# Patient Record
Sex: FEMALE | Race: BLACK OR AFRICAN AMERICAN | Employment: OTHER | URBAN - METROPOLITAN AREA
[De-identification: names, ages, dates, MRNs, and addresses within clinical notes are randomized per-mention and may not be internally consistent; named-entity substitution may affect disease eponyms.]

---

## 2017-01-05 ENCOUNTER — OFFICE VISIT (OUTPATIENT)
Dept: VASCULAR SURGERY | Age: 62
End: 2017-01-05

## 2017-01-05 DIAGNOSIS — I83.812 VARICOSE VEINS OF LEFT LOWER EXTREMITIES WITH PAIN: ICD-10-CM

## 2017-01-05 DIAGNOSIS — I87.2 VENOUS REFLUX: ICD-10-CM

## 2017-01-05 NOTE — PROCEDURES
[facility]   *** FINAL REPORT ***    Name: Gabriel Guerrero  MRN: [id number]       Outpatient  : 01 Dec 1955  HIS Order #: [id number]  [address] [facility] Visit #: [id number]  Date: 2017    TYPE OF TEST: Peripheral Venous Testing    REASON FOR TEST  Varicose veins, VV with pain, swelling or edema, Limb Pain    Left Leg:-  Deep venous thrombosis:           No  Superficial venous thrombosis:    Yes  Deep venous insufficiency:        Yes  Superficial venous insufficiency: Yes    Abnormal Valve Closure Times (seconds):    Left Common Femoral:  5.3    Left SFJ:             5.2    Left GSV proximal:    0.7    Left GSV mid:         1.8    Left SSV:             1.2    Vein Mapping:    Diam.   Depth  (mm)    Left Great Saphenous Vein:    High Thigh:      9.0    Mid Thigh:       2.4    Low Thigh:       1.8    Knee:            5.7    High Calf:       6.2    Low Calf: Ankle:    Left Small Saphenous Vein:    SPJ:             2.6    Mid Calf:        2.2    Ankle:           1.6    Giacomini:  Accessory saph.:  Reyes :  Markus :      INTERPRETATION/FINDINGS  Duplex images were obtained using 2-D gray scale, color flow and  spectral doppler analysis. Left leg :  1. No evidence of deep venous thrombosis detected in the common  femoral, femoral, popliteal, posterior tibial and peroneal veins. 2. Superficial venous thrombosis identified in the tributaries of the  anterior thigh branch. 3. Incompetent left great saphenous vein at the junction with reflux  in the anterior thigh branch of 4.6 seconds and in the GSV in the  proximal, distal thigh and below knee levels. Known history of GSV  ablation 9-15-06,  Classification: T4.  4. Deep venous reflux involving the common femoral vein. 5. Incompetent small saphenous vein with reflux in the mid calf. No  reflux at the junction. 6. Multiphasic tibial doppler signal at rest.  7. Patent contralateral CFV without thrombus.  Deep venous reflux in  the CFV measuring 0.8 seconds and superficial venous reflux in the GSV   at the [facility] measuring 4.5 seconds. Compared to 2007 exam, GSV is now widely patent s/p ablation and  continued evidence of significant reflux in the anterior thigh branch. SSV reflux is new from prior exam.    ADDITIONAL COMMENTS    I have personally reviewed the data relevant to the interpretation of  this  study. TECHNOLOGIST: Katty Vega RDMS  Signed: 01/05/2017 11:42 AM    PHYSICIAN: Isaías Gifford D.O.   Signed: 01/05/2017 05:20 PM

## 2017-01-17 ENCOUNTER — OFFICE VISIT (OUTPATIENT)
Dept: VASCULAR SURGERY | Age: 62
End: 2017-01-17

## 2017-01-17 VITALS
WEIGHT: 246 LBS | DIASTOLIC BLOOD PRESSURE: 74 MMHG | SYSTOLIC BLOOD PRESSURE: 132 MMHG | BODY MASS INDEX: 38.61 KG/M2 | RESPIRATION RATE: 18 BRPM | HEART RATE: 86 BPM | HEIGHT: 67 IN

## 2017-01-17 DIAGNOSIS — I83.893 VARICOSE VEINS OF BILATERAL LOWER EXTREMITIES WITH OTHER COMPLICATIONS: ICD-10-CM

## 2017-01-17 DIAGNOSIS — I87.2 SAPHENOFEMORAL VENOUS REFLUX: Primary | ICD-10-CM

## 2017-01-17 NOTE — MR AVS SNAPSHOT
Visit Information Date & Time Provider Department Dept. Phone Encounter #  
 1/17/2017  9:45 AM SHARI Mckinley BS [facility] [phone] [id number] Upcoming Health Maintenance Date Due DTaP/Tdap/Td series (1 - Tdap) 12/1/1976 PAP AKA CERVICAL CYTOLOGY 12/1/1976 BREAST CANCER SCRN MAMMOGRAM 12/1/2005 FOBT Q 1 YEAR AGE 50-75 12/1/2005 ZOSTER VACCINE AGE 60> 12/1/2015 INFLUENZA AGE 9 TO ADULT 8/1/2016 Allergies as of 1/17/2017  Review Complete On: 1/17/2017 By: Leslee Walker RN Severity Noted Reaction Type Reactions Latex, Natural Rubber  02/28/2014    Rash Doxycycline  02/28/2014    Shortness of Breath, Swelling Erythromycin  08/05/2015    Unknown (comments) Current Immunizations  Never Reviewed No immunizations on file. Not reviewed this visit You Were Diagnosed With   
  
 Codes Comments Saphenofemoral venous reflux    -  Primary ICD-10-CM: D02.0 ICD-9-CM: 459.81 Varicose veins of bilateral lower extremities with other complications     KXY-23-OM: L13.013 ICD-9-CM: 454.8 Vitals BP Pulse Resp Height(growth percentile) Weight(growth percentile) BMI  
 132/74 (BP 1 Location: Left arm, BP Patient Position: Sitting) 86 18 5' 7\" (1.702 m) 246 lb (111.6 kg) 38.53 kg/m2 OB Status Smoking Status Hysterectomy Former Smoker BMI and BSA Data Body Mass Index Body Surface Area 38.53 kg/m 2 2.3 m 2 Preferred Pharmacy Pharmacy Name Phone [facility] [address] [facility], [address] [facility] [phone] Your Updated Medication List  
  
   
This list is accurate as of: 1/17/17 10:08 AM.  Always use your most recent med list.  
  
  
  
  
 ALEVE 220 mg Cap Generic drug:  naproxen sodium Take 220 mg by mouth. Indications: PAIN  
  
 amLODIPine 5 mg tablet Commonly known as:  NORVASC  
  
 aspirin, buffered 81 mg Tab Take 81 mg by mouth daily. Indications: THROMBOSIS PREVENTION AFTER PCI  
  
 atorvastatin 40 mg tablet Commonly known as:  LIPITOR Take 40 mg by mouth daily. BENICAR HCT 40-12.5 mg per tablet Generic drug:  olmesartan-hydroCHLOROthiazide  
  
 black cohosh 540 mg Cap Take  by mouth daily. Indications: unsure of mg's CO Q-10 200 mg capsule Generic drug:  coenzyme Q-10 Take 200 mg by mouth daily. Indications: takes 5 times weekly  
  
 ibuprofen 800 mg tablet Commonly known as:  MOTRIN Take 1 Tab by mouth three (3) times daily as needed for Pain. LINZESS 145 mcg Cap capsule Generic drug:  linaclotide STOPPED BEFORE SURGERY  
  
 MIRALAX 17 gram packet Generic drug:  polyethylene glycol Take 17 g by mouth daily. oxyCODONE-acetaminophen 5-325 mg per tablet Commonly known as:  PERCOCET  
1-2 tablets every 4-6 hours prn pain  
  
 simvastatin 20 mg tablet Commonly known as:  ZOCOR SYNTHROID 75 mcg tablet Generic drug:  levothyroxine  
  
 traMADol 50 mg tablet Commonly known as:  ULTRAM  
  
 VOLTAREN 1 % Gel Generic drug:  diclofenac Please provide this summary of care documentation to your next provider. Your primary care clinician is listed as Lencho Ceja. If you have any questions after today's visit, please call [phone].

## 2017-01-17 NOTE — PROGRESS NOTES
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  After years of no recurrent symptoms she was advised that she could discontinue coumadin and take just aspirin      She had described that over the past few months she had noticed increased appearance of veins at her left ankle and with that increased pain and swelling as well  She has always worn stockings as well as elevates and exercises everyday  But these veins developed in spite of those efforts, so she inquired if any possible changes could have occurred    We ordered a reflux study and she is here to discuss results today, which were reviewed as follows: Abnormal Valve Closure Times (seconds):  Left Common Femoral: 5.3  Left SFJ: 5.2  Left GSV proximal: 0.7  Left GSV mid: 1.8  Left SSV: 1.2     Vein Mapping: Diam. Depth (mm)     Left Great Saphenous Vein:  High Thigh: 9.0  Mid Thigh: 2.4  Low Thigh: 1.8  Knee: 5.7  High Calf: 6.2     INTERPRETATION/FINDINGS  Left leg :  1. No evidence of deep venous thrombosis detected in the common femoral, femoral, popliteal, posterior tibial and peroneal veins. 2. Superficial venous thrombosis identified in the tributaries of the anterior thigh branch. 3. Incompetent left great saphenous vein at the junction with reflux in the anterior thigh branch of 4.6 seconds and in the GSV in the proximal, distal thigh and below knee levels. Known history of GSV ablation 9-15-06, Classification: T4.  4. Deep venous reflux involving the common femoral vein. 5. Incompetent small saphenous vein with reflux in the mid calf. No reflux at the junction. 6. Multiphasic tibial doppler signal at rest.  7. Patent contralateral CFV without thrombus. Deep venous reflux in the CFV measuring 0.8 seconds and superficial venous reflux in the GSV at the [facility] measuring 4.5 seconds.   Compared to 2007 exam, GSV is now widely patent s/p ablation and continued evidence of significant reflux in the anterior thigh branch. SSV reflux is new from prior exam.    I did explain that the prior ablation did \"re-open\"  Vein is not very dilated but could at least do repeat ablation on proximal portion  Then will follow up for that accessory vein area and some of her lower leg veins for potential sclerotherapy  We did review details for the ablation procedure since it has been years since her last one. She acknowledges an understanding. She is interested in proceeding after her  recovers from his knee replacement surgery next month.  We will contact her for scheduling arrangements    Total time spent was 15 minutes

## 2017-03-21 ENCOUNTER — DOCUMENTATION ONLY (OUTPATIENT)
Dept: VASCULAR SURGERY | Age: 62
End: 2017-03-21

## 2017-03-21 NOTE — PROGRESS NOTES
Patient called in. She went to go get her stockings and found out that they need to be custom made. She states she will not have them before her procedure on Tuesday March 28th. She is going to cancel until she gets her stockings and then reschedule. She called back in after this conversation asking if it was ok to get the pantyhose instead of the thigh high and I informed her this was fine as long as the compression was the same.

## 2017-03-24 DIAGNOSIS — I83.893 VARICOSE VEINS OF BOTH LEGS WITH EDEMA: Primary | ICD-10-CM

## 2017-03-24 RX ORDER — LORAZEPAM 1 MG/1
TABLET ORAL
Qty: 3 TAB | Refills: 0
Start: 2017-03-24 | End: 2017-05-01 | Stop reason: SDUPTHER

## 2017-03-24 NOTE — TELEPHONE ENCOUNTER
Order for Ativan 1 mg to take as directed and bring to procedure, #3/0 placed per Verbal order from Dr. Radha Dailey . Pt verbalized understanding .

## 2017-05-01 DIAGNOSIS — I83.893 VARICOSE VEINS OF BOTH LEGS WITH EDEMA: Primary | ICD-10-CM

## 2017-05-01 RX ORDER — LORAZEPAM 1 MG/1
TABLET ORAL
Qty: 3 TAB | Refills: 0
Start: 2017-05-01 | End: 2018-07-13

## 2017-05-01 NOTE — TELEPHONE ENCOUNTER
Order for ativan 1 mg to be taken as directed and brought to procedure #3/0 placed per Verbal order from Dr. Cameron Grant . Pt verbalized understanding .

## 2017-05-05 ENCOUNTER — OFFICE VISIT (OUTPATIENT)
Dept: VASCULAR SURGERY | Age: 62
End: 2017-05-05

## 2017-05-05 DIAGNOSIS — I87.2 VENOUS (PERIPHERAL) INSUFFICIENCY: ICD-10-CM

## 2017-05-05 DIAGNOSIS — M79.89 SWELLING OF LIMB: ICD-10-CM

## 2017-05-05 DIAGNOSIS — I83.892 VARICOSE VEINS OF LOWER EXTREMITIES WITH COMPLICATIONS, LEFT: ICD-10-CM

## 2017-05-05 NOTE — PROGRESS NOTES
[facility]    [employer]    Pre-Operative Diagnosis: Symptomatic superficial venous incompetence  Post-Operative Diagnosis: Symptomatic superficial venous incompetence  Procedure: Endovenous ablation left greater saphenous vein, endovenous ablation left greater saphenous accessory vein  Surgeon: Kapil Tejeda MD   Anesthesia: Local  Estimated Blood Loss: Minimal  Complications: None    Pre pain assessment: 0 out of 10. Post pain assessment: 0 out of 10. The patient was placed on the procedure table. The left leg was prepped and draped in the usual sterile fashion. The skin was anesthetized with 1% Xylocaine. Ultrasound guided access was obtained at the at the knee level into the left left  saphenous vein after the vein had been mapped with duplex ultrasound. The micro puncture wire was advanced and sheath placed over the wire into the saphenous vein. The RFA Closure catheter was then advanced under direct visualization to the saphenofemoral junction. The probes of the catheter were opened and the catheter was withdrawn to approximately 2 cm distal to the  saphenofemoral junction. Continuous irrigation through the catheter was done to prevent clotting. After adequate positioning of the catheter had been verified, the leg was infiltrated with Tumescent anesthesia using Lidocaine with epinephrine. It was then proceeded with radio frequency ablation of the saphenous vein starting at the  saphenofemoral level. The patient was placed in Trendelenburg. Pressure was supplied to the vein and the temperature was maintained around 120 degrees. The impedance was within range. The vein was treated down to the level of the knee with excellent results by ultrasound as demonstrated by thickening of the vein wall and no flow. Treatment time was 4 minutes. The catheter was then pulled into the sheath. The sheath was pulled out of the vein and pressure was applied.   The patient was observed for 10 minutes and appeared to be in stable condition. A thigh high compression stocking was applied. Vital signs remained stable throughput the procedure. The patient was then discharged in stable condition and follow-up was arranged. A second greater saphenous vein was identified to be complete within the sheath anterior to the main greater saphenous. I gained access to this and placed a sheath and passed the catheter all the way up to the saphenofemoral junction access was just above the knee on the left thigh to the saphenofemoral junction. A tumesced similarly as the greater and then did pullback ablation same temperature settings.   She tolerated this well sutures were placed at both access sites    Mayra Wilson MD

## 2017-05-05 NOTE — PATIENT INSTRUCTIONS
POST CLOSURE INSTRUCTIONS:    REST FOR THE REMAINDER OF THE DAY AND ELEVATE YOUR LEGS , YOU MAY AMBULATE. FREQUENT AMBULATION, BUT REFRAIN FROM STRENUOUS ACTIVITY, HEAVY LIFTING, OR STANDING FOR LONG PERIODS OF TIME FOR ONE WEEK. RETURN WITH IN A WEEK FOR FOLLOW UP ULTRASOUND EXAMINATION OF THE TREATED VEIN TO RULE OUT THE PRESENCE OF A CLOT IN THE VEIN. THEN WEAR YOUR ELASTIC SUPPORT STOCKINGS NIGHT AND DAY FOR THE FIRST TWO WEEKS. FOR THE NEXT FOUR WEEKS, WEAR THEM DAILY , YOU MAY REMOVE THEM AT NIGHT. WHEN POSSIBLE AVOID ACTIONS THAT COULD CAUSE A SUDDEN INCREASE IN VENOUS PRESSURE (BENDING DOWN QUICKLY, VALSALVA MANEUVER, COUGHING SNEEZING,ETC)     PLEASE CALL AND ASK TO SPEAK WITH A NURSE(OR AFTER HOURS TO DOCTOR ON CALL) IF YOU EXPERIENCE:      REDNESS OR DRAINAGE AT THE INCISION    INCREASED SWELLING OF THE LEG    TEMPERATURE ABOVE 100.5 ORALLY    SEVERE PAIN IN THE LEGS.

## 2017-05-05 NOTE — MR AVS SNAPSHOT
Visit Information Date & Time Provider Department Dept. Phone Encounter #  
 5/5/2017  9:00 AM MD [facility] [phone] Your Appointments 5/8/2017 10:00 AM  
PROCEDURE with [facility] IMAGING 1 [facility] ([address] [place] [address]) Appt Note: l le f/u [place]; .  
 [address] [facility] [address] [facility]  
[phone] [address]  
  
    
 5/12/2017  9:00 AM  
Follow Up with [facility] ([facility]) Appt Note: follow up left leg closure; pt will not have stockings in time. will call back after getting them. jlm; rescheduled closure; rescheduled from [place] schedule 2300 [facility] [address] [facility]  
[phone] 2300 [facility] Niraj King [address] [facility] Upcoming Health Maintenance Date Due DTaP/Tdap/Td series (1 - Tdap) 12/1/1976 PAP AKA CERVICAL CYTOLOGY 12/1/1976 BREAST CANCER SCRN MAMMOGRAM 12/1/2005 FOBT Q 1 YEAR AGE 50-75 12/1/2005 ZOSTER VACCINE AGE 60> 12/1/2015 INFLUENZA AGE 9 TO ADULT 8/1/2017 Allergies as of 5/5/2017  Review Complete On: 5/5/2017 By: Jesse Vázquez MD  
  
 Severity Noted Reaction Type Reactions Latex, Natural Rubber  02/28/2014    Rash Doxycycline  02/28/2014    Shortness of Breath, Swelling Erythromycin  08/05/2015    Unknown (comments) Current Immunizations  Never Reviewed No immunizations on file. Not reviewed this visit You Were Diagnosed With   
  
 Codes Comments Varicose veins of lower extremities with complications, left     AOA-23-MF: P88.491 ICD-9-CM: 848. 8 Swelling of limb     ICD-10-CM: M79.89 ICD-9-CM: 729.81 Venous (peripheral) insufficiency     ICD-10-CM: I87.2 ICD-9-CM: 459.81 Vitals OB Status Smoking Status Hysterectomy Former Smoker Preferred Pharmacy Pharmacy Name Phone [facility] [address], [address] [facility] [phone] Your Updated Medication List  
  
   
This list is accurate as of: 5/5/17 10:23 AM.  Always use your most recent med list.  
  
  
  
  
 ALEVE 220 mg Cap Generic drug:  naproxen sodium Take 220 mg by mouth. Indications: PAIN  
  
 amLODIPine 5 mg tablet Commonly known as:  NORVASC  
  
 aspirin, buffered 81 mg Tab Take 81 mg by mouth daily. Indications: THROMBOSIS PREVENTION AFTER PCI  
  
 atorvastatin 40 mg tablet Commonly known as:  LIPITOR Take 40 mg by mouth daily. BENICAR HCT 40-12.5 mg per tablet Generic drug:  olmesartan-hydroCHLOROthiazide  
  
 black cohosh 540 mg Cap Take  by mouth daily. Indications: unsure of mg's CO Q-10 200 mg capsule Generic drug:  coenzyme Q-10 Take 200 mg by mouth daily. Indications: takes 5 times weekly  
  
 ibuprofen 800 mg tablet Commonly known as:  MOTRIN Take 1 Tab by mouth three (3) times daily as needed for Pain. LINZESS 145 mcg Cap capsule Generic drug:  linaclotide STOPPED BEFORE SURGERY  
  
 LORazepam 1 mg tablet Commonly known as:  ATIVAN Take as directed and bring to procedure MIRALAX 17 gram packet Generic drug:  polyethylene glycol Take 17 g by mouth daily. oxyCODONE-acetaminophen 5-325 mg per tablet Commonly known as:  PERCOCET  
1-2 tablets every 4-6 hours prn pain  
  
 simvastatin 20 mg tablet Commonly known as:  ZOCOR SYNTHROID 75 mcg tablet Generic drug:  levothyroxine  
  
 traMADol 50 mg tablet Commonly known as:  ULTRAM  
  
 VOLTAREN 1 % Gel Generic drug:  diclofenac We Performed the Following [place] ENDOVEN ABLTJ INCMPTNT VEIN XTR RF 2ND+ VEINS [id number] CPT(R)] [place] ENDOVENOUS RF, 1ST VEIN [id number] CPT(R)] To-Do List   
 05/08/2017 Imaging:  DUPLEX LOWER EXT VENOUS LEFT AMB Patient Instructions POST CLOSURE INSTRUCTIONS: 
 
REST FOR THE REMAINDER OF THE DAY AND ELEVATE YOUR LEGS , YOU MAY AMBULATE. FREQUENT AMBULATION, BUT REFRAIN FROM STRENUOUS ACTIVITY, HEAVY LIFTING, OR STANDING FOR LONG PERIODS OF TIME FOR ONE WEEK. RETURN WITH IN A WEEK FOR FOLLOW UP ULTRASOUND EXAMINATION OF THE TREATED VEIN TO RULE OUT THE PRESENCE OF A CLOT IN THE VEIN. THEN WEAR YOUR ELASTIC SUPPORT STOCKINGS NIGHT AND DAY FOR THE FIRST TWO WEEKS. FOR THE NEXT FOUR WEEKS, WEAR THEM DAILY , YOU MAY REMOVE THEM AT NIGHT. WHEN POSSIBLE AVOID ACTIONS THAT COULD CAUSE A SUDDEN INCREASE IN VENOUS PRESSURE (BENDING DOWN QUICKLY, VALSALVA MANEUVER, COUGHING SNEEZING,ETC) PLEASE CALL AND ASK TO SPEAK WITH A NURSE(OR AFTER HOURS TO DOCTOR ON CALL) IF YOU EXPERIENCE: 
 
 
REDNESS OR DRAINAGE AT THE INCISION 
 
INCREASED SWELLING OF THE LEG 
 
TEMPERATURE ABOVE 100.5 ORALLY SEVERE PAIN IN THE LEGS. Please provide this summary of care documentation to your next provider. Your primary care clinician is listed as Sandee Borrero. If you have any questions after today's visit, please call [phone].

## 2017-05-05 NOTE — PROGRESS NOTES
[facility]          Chart reviewed for the following:   Pedro WATERS LPN, have reviewed the medications and updated the Allergic reactions for [place] performed immediately prior to start of procedure:   Pedro WATERS LPN, have performed the following reviews on [facility] prior to the start of the procedure:            * Patient was identified by name and date of birth   * Agreement that consent was signed and dated  * Procedure site verified   * Patient was positioned for comfort  * Verbal consent was given by patient  * Patients pain level assessment completed     Time: 10:30      Date of procedure: 5/5/2017    Procedure performed by:  Nav Aggarwal MD    Specimen sent to lab: No    How tolerated by patient: tolerated the procedure well with no complications    Discharge instructions reviewed and given to patient: Yes    Comments:   Applied compression dressing to two incision sites above knee of left leg and mid thigh, patient tolerated well and informed patient to keep dressing intact for 24 hours, patient tolerated well. Assisted patient with applying thigh high compression stocking and reminded patient to wear day and night for 72 hours and then daily for a total of two weeks, patient stated understood. Reviewed all discharge instructions with patient who stated understood and would call with any questions or concerns.

## 2017-05-08 ENCOUNTER — OFFICE VISIT (OUTPATIENT)
Dept: VASCULAR SURGERY | Age: 62
End: 2017-05-08

## 2017-05-08 DIAGNOSIS — M79.89 SWELLING OF LIMB: ICD-10-CM

## 2017-05-08 DIAGNOSIS — I87.2 VENOUS (PERIPHERAL) INSUFFICIENCY: ICD-10-CM

## 2017-05-08 DIAGNOSIS — I83.892 VARICOSE VEINS OF LOWER EXTREMITIES WITH COMPLICATIONS, LEFT: ICD-10-CM

## 2017-05-08 NOTE — PROCEDURES
[facility]   *** FINAL REPORT ***    Name: Jesus Paiz  MRN: [id number]       Outpatient  : 01 Dec 1955  HIS Order #: [id number]  [address] [facility] Visit #: [id number]  Date: 08 May 2017    TYPE OF TEST: Peripheral Venous Testing    REASON FOR TEST  Varicose veins, Venous Insufficiency, Follow up Closure    Left Leg:-  Deep venous thrombosis:           Not examined  Superficial venous thrombosis:    Not examined  Deep venous insufficiency:        Not examined  Superficial venous insufficiency: Not examined      INTERPRETATION/FINDINGS  Post Closure Procedure   Date: 17  Duplex images were obtained using 2-D gray scale, color flow and  spectral doppler analysis. 1. Mechanical occlusion of the left great saphenous vein without  involvement of the common femoral vein. The treatment is flush with  the sapheno femoral junction. 2. Classification: T1 from high thigh to mid to distal thigh, T2:2a  distal thigh/above knee level. 3. Accessory vein treated with occlusion of the vein from junction to  mid/distal upper thigh. 4. GSV is patent at knee without evidence of reflux. ADDITIONAL COMMENTS    I have personally reviewed the data relevant to the interpretation of  this  study. TECHNOLOGIST: Imani Farrar RDMS  Signed: 2017 10:53 AM    PHYSICIAN: Valentina Keith.  Chiquita Kumar MD  Signed: 2017 01:02 PM

## 2017-05-12 ENCOUNTER — OFFICE VISIT (OUTPATIENT)
Dept: VASCULAR SURGERY | Age: 62
End: 2017-05-12

## 2017-05-12 VITALS
RESPIRATION RATE: 18 BRPM | HEART RATE: 80 BPM | DIASTOLIC BLOOD PRESSURE: 78 MMHG | HEIGHT: 67 IN | BODY MASS INDEX: 38.61 KG/M2 | SYSTOLIC BLOOD PRESSURE: 132 MMHG | WEIGHT: 246 LBS

## 2017-05-12 DIAGNOSIS — I87.2 SAPHENOFEMORAL VENOUS REFLUX: ICD-10-CM

## 2017-05-12 DIAGNOSIS — I83.893 VARICOSE VEINS OF BOTH LEGS WITH EDEMA: Primary | ICD-10-CM

## 2017-08-30 ENCOUNTER — HOSPITAL ENCOUNTER (OUTPATIENT)
Dept: BONE DENSITY | Age: 62
Discharge: HOME | End: 2017-08-30
Attending: FAMILY MEDICINE
Payer: COMMERCIAL

## 2017-08-30 DIAGNOSIS — Z13.820 SCREENING FOR OSTEOPOROSIS: ICD-10-CM

## 2017-08-30 PROCEDURE — 77080 DXA BONE DENSITY AXIAL: CPT

## 2017-10-12 ENCOUNTER — HOSPITAL ENCOUNTER (OUTPATIENT)
Dept: CT IMAGING | Age: 62
Discharge: HOME | End: 2017-10-12
Attending: PHYSICIAN ASSISTANT
Payer: COMMERCIAL

## 2017-10-12 DIAGNOSIS — H93.12 LEFT-SIDED TINNITUS: ICD-10-CM

## 2017-10-12 PROCEDURE — 70480 CT ORBIT/EAR/FOSSA W/O DYE: CPT

## 2018-02-08 ENCOUNTER — HOSPITAL ENCOUNTER (OUTPATIENT)
Dept: GENERAL RADIOLOGY | Age: 63
Discharge: HOME | End: 2018-02-08
Payer: COMMERCIAL

## 2018-02-08 DIAGNOSIS — R77.8 ABNORMAL SPEP: ICD-10-CM

## 2018-02-08 PROCEDURE — 77075 RADEX OSSEOUS SURVEY COMPL: CPT

## 2018-05-22 ENCOUNTER — HOSPITAL ENCOUNTER (OUTPATIENT)
Dept: LAB | Age: 63
Discharge: HOME | End: 2018-05-22
Payer: COMMERCIAL

## 2018-05-22 LAB
ALBUMIN SERPL-MCNC: 3.2 G/DL (ref 3.4–5)
ALBUMIN/GLOB SERPL: 0.9 {RATIO} (ref 0.8–1.7)
ALP SERPL-CCNC: 59 U/L (ref 45–117)
ALT SERPL-CCNC: 24 U/L (ref 13–56)
ANION GAP SERPL CALC-SCNC: 8 MMOL/L (ref 3–18)
AST SERPL-CCNC: 20 U/L (ref 15–37)
BILIRUB SERPL-MCNC: 0.4 MG/DL (ref 0.2–1)
BUN SERPL-MCNC: 16 MG/DL (ref 7–18)
BUN/CREAT SERPL: 22 (ref 12–20)
CALCIUM SERPL-MCNC: 9.6 MG/DL (ref 8.5–10.1)
CHLORIDE SERPL-SCNC: 107 MMOL/L (ref 100–108)
CO2 SERPL-SCNC: 31 MMOL/L (ref 21–32)
CREAT SERPL-MCNC: 0.74 MG/DL (ref 0.6–1.3)
GLOBULIN SER CALC-MCNC: 3.6 G/DL (ref 2–4)
GLUCOSE SERPL-MCNC: 87 MG/DL (ref 74–99)
POTASSIUM SERPL-SCNC: 4.4 MMOL/L (ref 3.5–5.5)
PROT SERPL-MCNC: 6.8 G/DL (ref 6.4–8.2)
SODIUM SERPL-SCNC: 146 MMOL/L (ref 136–145)

## 2018-05-22 PROCEDURE — 80053 COMPREHEN METABOLIC PANEL: CPT | Performed by: FAMILY MEDICINE

## 2018-05-22 PROCEDURE — 36415 COLL VENOUS BLD VENIPUNCTURE: CPT | Performed by: FAMILY MEDICINE

## 2018-06-15 ENCOUNTER — OFFICE VISIT (OUTPATIENT)
Dept: ORTHOPEDIC SURGERY | Age: 63
End: 2018-06-15

## 2018-06-15 VITALS
WEIGHT: 278 LBS | SYSTOLIC BLOOD PRESSURE: 127 MMHG | RESPIRATION RATE: 16 BRPM | OXYGEN SATURATION: 98 % | DIASTOLIC BLOOD PRESSURE: 76 MMHG | HEIGHT: 67 IN | TEMPERATURE: 96.6 F | HEART RATE: 70 BPM | BODY MASS INDEX: 43.63 KG/M2

## 2018-06-15 DIAGNOSIS — M17.0 ARTHRITIS OF BOTH KNEES: ICD-10-CM

## 2018-06-15 DIAGNOSIS — M25.562 PAIN IN BOTH KNEES, UNSPECIFIED CHRONICITY: Primary | ICD-10-CM

## 2018-06-15 DIAGNOSIS — M25.561 PAIN IN BOTH KNEES, UNSPECIFIED CHRONICITY: Primary | ICD-10-CM

## 2018-06-15 PROBLEM — E66.01 OBESITY, MORBID (HCC): Status: ACTIVE | Noted: 2018-06-15

## 2018-06-15 RX ORDER — DICLOFENAC SODIUM 75 MG/1
75 TABLET, DELAYED RELEASE ORAL 2 TIMES DAILY
Qty: 60 TAB | Refills: 1 | Status: SHIPPED | OUTPATIENT
Start: 2018-06-15 | End: 2019-05-16 | Stop reason: SDUPTHER

## 2018-06-15 NOTE — PROGRESS NOTES
HISTORY OF PRESENT ILLNESS:  Luis Alberto Rodarte is here for consultation regarding pain in both knees. She has not been here for about 2 1/2 years but she does remember having some cortisone shots to her knees in the past.  She does not remember if they helped or not. She has also had her left knee scoped in the past but she also does not remember if that really helped or not. She notes pain particularly when she is going up and down steps as well as getting out of low chairs or off toilet seats. She has pain when she is walking occasionally but not all the time. Her  recently underwent a left total knee arthroplasty revision in April, a couple of months ago, and she is helping him recover from that. His recovery has been very slow she has had to do a lot of extra work around the house. This has aggravated her knees. Her family doctor told her to take Tylenol but this has not really been helping her pain. She points to pain in the anterior aspect of her knees. She does not describe mechanical locking symptoms of her knees. She is not utilizing ambulatory assist.  She has not worn any braces on her knees. She has not had recent therapy for her knees. PHYSICAL EXAMINATION:  Clinical examination reveals morbidly obese 58 y.o. female in mild discomfort. She moves slowly on and off the examination table. She does have crepitus to the patellofemoral area of both knees with flexion and extension. She has palpable lateral patellar tenderness bilaterally. Patellofemoral compression test negative bilaterally. She has good functional range of motion of both knees from full extension to flexion about 120 degrees. Flexion beyond this is limited secondary to her obesity. She has satisfactory collateral as well as crucial ligaments to the left and right knee. She has palpable medial joint line tenderness bilaterally however Casey's test is negative bilaterally. Lachman's test is negative. She has no calf tenderness or swelling. Neurovascularly intact to the lower extremities proximally and distally to motor strength and sensation. Anterior pivot shift test is negative bilaterally. She has good passive range of motion of both hips without discomfort. RADIOGRAPHS:  X-rays of both knees reveal significant arthritis of the patellofemoral joints of both knees. She still had good joint space in the weightbearing portion of both knees. IMPRESSION:  Arthritis, patellofemoral joint, both knees. RECOMMENDATIONS:  First and foremost I discussed with her the importance of weight reduction. We have given her a brochure for the [facility] weight reduction program.  In addition, I will start her on an prescription antiinflammatory medication and see if this helps her. She will discontinue any naproxen or any other antiinflammatories that she is taking. She may take a baby aspirin a day still. I will check her back in about four weeks and if this has not helped her, consideration could be made for cortisone injections, possible gel injections. First and foremost, however, it is indicated that she lose weight. All of her questions were answered today. Vitals:    06/15/18 1031   BP: 127/76   Pulse: 70   Resp: 16   Temp: 96.6 °F (35.9 °C)   TempSrc: Oral   SpO2: 98%   Weight: 278 lb (126.1 kg)   Height: 5' 7\" (1.702 m)       Patient Active Problem List   Diagnosis Code    Incisional hernia K43.2    Varicose veins of both legs with edema I83.893    Obesity, morbid ([facility]) E66.01     Patient Active Problem List    Diagnosis Date Noted    Obesity, morbid ([facility] 06/15/2018    Varicose veins of both legs with edema 03/24/2017    Incisional hernia 07/02/2014     Current Outpatient Prescriptions   Medication Sig Dispense Refill    diclofenac EC (VOLTAREN) 75 mg EC tablet Take 1 Tab by mouth two (2) times a day.  60 Tab 1    atorvastatin (LIPITOR) 40 mg tablet Take 40 mg by mouth daily.  polyethylene glycol (MIRALAX) 17 gram packet Take 17 g by mouth daily.  Aspirin, Buffered 81 mg tab Take 81 mg by mouth daily. Indications: THROMBOSIS PREVENTION AFTER PCI      SYNTHROID 75 mcg tablet       simvastatin (ZOCOR) 20 mg tablet       BENICAR HCT 40-12.5 mg per tablet       amLODIPine (NORVASC) 5 mg tablet       LORazepam (ATIVAN) 1 mg tablet Take as directed and bring to procedure 3 Tab 0    coenzyme Q-10 (CO Q-10) 200 mg capsule Take 200 mg by mouth daily. Indications: takes 5 times weekly      traMADol (ULTRAM) 50 mg tablet       oxyCODONE-acetaminophen (PERCOCET) 5-325 mg per tablet 1-2 tablets every 4-6 hours prn pain 40 Tab 0    black cohosh 540 mg cap Take  by mouth daily.  Indications: unsure of mg's      LINZESS 145 mcg cap capsule STOPPED BEFORE SURGERY       Allergies   Allergen Reactions    Latex, Natural Rubber Rash    Doxycycline Shortness of Breath and Swelling    Erythromycin Unknown (comments)     Past Medical History:   Diagnosis Date    Abdominal pain     Arthritis     Colon polyp     History of fall 11/2014    Right shoulder pain, low back pain    Hypertension     Ill-defined condition     MVP resolved    MVP (mitral valve prolapse)     Osteoarthritis of knee     Thromboembolus ([address]     brenda DVT  2007    Thyroid disease     Unspecified sleep apnea     uses cpap     Past Surgical History:   Procedure Laterality Date    ABDOMEN SURGERY PROC UNLISTED      removed fatty mass    HX HERNIA REPAIR      HX HYSTERECTOMY      HX KNEE ARTHROSCOPY Right     x2    HX ORTHOPAEDIC       Family History   Problem Relation Age of Onset    Hypertension Mother     Arthritis-osteo Mother     Heart Disease Father     Hypertension Father     Arthritis-osteo Father     Hypertension Sister     Hypertension Brother     Kidney Disease Brother      Social History   Substance Use Topics    Smoking status: Former Smoker    Smokeless tobacco: Never Used    Alcohol use Yes      Comment: rare

## 2018-07-13 ENCOUNTER — OFFICE VISIT (OUTPATIENT)
Dept: ORTHOPEDIC SURGERY | Age: 63
End: 2018-07-13

## 2018-07-13 VITALS
WEIGHT: 275 LBS | SYSTOLIC BLOOD PRESSURE: 131 MMHG | HEART RATE: 77 BPM | BODY MASS INDEX: 43.16 KG/M2 | TEMPERATURE: 96.2 F | DIASTOLIC BLOOD PRESSURE: 82 MMHG | HEIGHT: 67 IN | OXYGEN SATURATION: 98 % | RESPIRATION RATE: 16 BRPM

## 2018-07-13 DIAGNOSIS — M22.41 PATELLA, CHONDROMALACIA, RIGHT: Primary | ICD-10-CM

## 2018-07-13 DIAGNOSIS — M22.42 PATELLA, CHONDROMALACIA, LEFT: ICD-10-CM

## 2018-07-13 NOTE — PROGRESS NOTES
HISTORY OF PRESENT ILLNESS:  Lilibeth Garcia is here for followup with reference to both knees. She is being treated for chondromalacia patellofemoral disease of both knees. She does feel that the Voltaren medication helped her somewhat. She also, however, has the cream at home, which she has not used, but she had that previously. She notes only occasional pain in her knees. It is not severe. She has been trying to lose a little bit weight. She has lost about 3 pounds recently. PHYSICAL EXAMINATION:   Clinical examination reveals a significantly overweight, 22-year-old female in minimal discomfort. She well-aligned easily off the examination table. She has mild crepitus in the patellofemoral area of both knees with flexion and extension. She has good range of motion of the both knees to flexion of about 110°. Flexion is limited beyond this in both knees secondary to obesity. She has good collateral, as well as cruciate ligament stability of both knees. Patellofemoral compression test is negative bilaterally. She has slight lateral patellar facet tenderness bilaterally. IMPRESSION:  Chondromalacia patellofemoral disease both knees. RECOMMENDATIONS:  At this point, I do not think the Voltaren pills have helped her to warrant she continue on the medication. I have suggested she use the Voltaren cream now for bout three to four weeks to see if this helps here. I also do not feel her symptoms are warranted enough to proceed with injections yet at this point, and she agrees with this. She will return to see me in about four weeks, and we will see how much the gel helped her. Again, the importance of weight reduction was discussed with the patient. All her questions were answered today.                     Vitals:    07/13/18 1024   BP: 131/82   Pulse: 77   Resp: 16   Temp: 96.2 °F (35.7 °C)   TempSrc: Oral   SpO2: 98%   Weight: 275 lb (124.7 kg)   Height: 5' 7\" (1.702 m)       Patient Active Problem List   Diagnosis Code    Incisional hernia K43.2    Varicose veins of both legs with edema I83.893    Obesity, morbid ([facility] [address] E66.01     Patient Active Problem List    Diagnosis Date Noted    [facility], [facility] 06/15/2018    Varicose veins of both legs with edema 03/24/2017    Incisional hernia 07/02/2014     Current Outpatient Prescriptions   Medication Sig Dispense Refill    diclofenac EC (VOLTAREN) 75 mg EC tablet Take 1 Tab by mouth two (2) times a day. 60 Tab 1    atorvastatin (LIPITOR) 40 mg tablet Take 40 mg by mouth daily.  polyethylene glycol (MIRALAX) 17 gram packet Take 17 g by mouth daily.  Aspirin, Buffered 81 mg tab Take 81 mg by mouth daily.  Indications: THROMBOSIS PREVENTION AFTER PCI      SYNTHROID 75 mcg tablet       simvastatin (ZOCOR) 20 mg tablet       BENICAR HCT 40-12.5 mg per tablet       amLODIPine (NORVASC) 5 mg tablet        Allergies   Allergen Reactions    Latex, Natural Rubber Rash    Doxycycline Shortness of Breath and Swelling    Erythromycin Unknown (comments)     Past Medical History:   Diagnosis Date    Abdominal pain     Arthritis     Colon polyp     History of fall 11/2014    Right shoulder pain, low back pain    Hypertension     Ill-defined condition     MVP resolved    MVP (mitral valve prolapse)     Osteoarthritis of knee     Thromboembolus ([facility] [address]     brenda DVT  2007    Thyroid disease     Unspecified sleep apnea     uses cpap     Past Surgical History:   Procedure Laterality Date    ABDOMEN SURGERY PROC UNLISTED      removed fatty mass    HX HERNIA REPAIR      HX HYSTERECTOMY      HX KNEE ARTHROSCOPY Right     x2    HX ORTHOPAEDIC       Family History   Problem Relation Age of Onset    Hypertension Mother     Arthritis-osteo Mother     Heart Disease Father     Hypertension Father     Arthritis-osteo Father     Hypertension Sister     Hypertension Brother     Kidney Disease Brother      Social History   Substance Use Topics    Smoking status: Former Smoker    Smokeless tobacco: Never Used    Alcohol use Yes      Comment: rare

## 2018-08-27 ENCOUNTER — HOSPITAL ENCOUNTER (OUTPATIENT)
Dept: PHYSICAL THERAPY | Age: 63
Discharge: HOME | End: 2018-08-27
Payer: COMMERCIAL

## 2018-08-27 PROCEDURE — 97110 THERAPEUTIC EXERCISES: CPT

## 2018-08-27 PROCEDURE — 97161 PT EVAL LOW COMPLEX 20 MIN: CPT

## 2018-08-27 NOTE — PROGRESS NOTES
PT DAILY TREATMENT NOTE/KNEE EVAL 3-16    Patient Name: Salina Mcclelland  Date:2018  : 1955  [x]  Patient  Verified  Payor: Rayne Perez / Plan:  [facility] / Product Type: PPO /    In time:4:00  Out time:4:34  Total Treatment Time (min): 34  Total Timed Codes (min): 10  1:1 Treatment Time ( only): 34   Visit #: 1 of 12    Treatment Area: Pain in right knee [M25.561]  Pain in left knee [M25.562]    SUBJECTIVE  Pain Level (0-10 scale): 1  []constant [x]intermittent []improving []worsening []no change since onset  5/10- catching/ prolonged walking      Any medication changes, allergies to medications, adverse drug reactions, diagnosis change, or new procedure performed?: [x] No    [] Yes (see summary sheet for update)  Subjective functional status/changes:     PLOF: gardening, lawn work, daily walking   Limitations to PLOF: difficulties with lawn work/ prolonged walking or standing , difficulty with stairs  Mechanism of Injury: Patient reports bilateral knee pain for about 3 months. (told she has arthritis)  States it has been getting worse within the last couple of months- she has had 2 arthroscopic surgeries on the L knee (states there was \"torn stuff in there\" /)  She has the most difficulty with walking- states she gets a catch in her knee, makes her feel like she may fall.    She has not fallen in the past due to the pain-  Current symptoms/Complaints: Pain in bilateral knees   Previous Treatment/Compliance: prescribed medications (no relief), uses voltarin gel (with some relief)   PMHx/Surgical Hx: HBP- controlled , vascular problems In B legs  Work Hx: Retired  Living Situation: lives in one story house with 4 steps to enter, one handrail- reports difficulties with stairs , lives with  who is unable to help if needed  Pt Goals: Get back to an exercise routine at home    Motivation: Exercise at home    Cognition: A & O x 4    Other:    OBJECTIVE/EXAMINATION      24 min [x]Eval                  []Re-Eval       10 min Therapeutic Exercise:  [x] See flow sheet :   Rationale: increase strength, improve balance and increase proprioception to improve the patients ability to perform daily household chores.        With   [] TE   [] TA   [] neuro   [] other: Patient Education: [x] Review HEP    [] Progressed/Changed HEP based on:   [] positioning   [] body mechanics   [] transfers   [] heat/ice application    [] other:          Physical Therapy Evaluation - Knee    Posture: [] Varus    [] Valgus    [] Recurvatum        [] Tibial Torsion    [] Foot Supination    [] Foot Pronation    Describe:    Gait:  [] Normal    [x] Abnormal    [x] Antalgic    [] NWB    Device:    Describe:    ROM / Strength  [] Unable to assess       Strength (1-5)    Left Right   Hip Flexion 4 4    IR/ER 4- 4-    Abduction 4 4    Adduction 4- 4-   Knee Flexion 4 4    Extension 4- 4-   Ankle Plantarflexion      Dorsiflexion 4+ 4+     All hip and knee AROM WNL and symmetrical     Flexibility: [] Unable to assess at this time  Hamstrings:    (L) Tightness= [] WNL   [x] Min   [] Mod   [] Severe    (R) Tightness= [] WNL   [x] Min   [] Mod   [] Severe  Quadriceps:    (L) Tightness= [] WNL   [x] Min   [] Mod   [] Severe    (R) Tightness= [] WNL   [x] Min   [] Mod   [] Severe      Palpation: Patient reports tenderness to palpation along bilateral pes anserine, bilateral hamstring tendons, and left lateral joint line    Optional Tests:  Patellar Positioning (Static)   []L []R Normal []L []R Lateral   [x]L [x]R Sachi Flaming      []L []R Medial   []L []R [place]    Patellar Tracking   []L []R Glide (Lat)   []L []R Tilt (Lat)     []L []R Glide (Med)  []L []R Tilt (Med)      []L []R Tile (Inf)     Patellar Mobility   [x]L [x]R Hypermobile []L []R Hypomobile                Pain Level (0-10 scale) post treatment: 1    ASSESSMENT/Changes in Function: 58year old female patient presents to the clinic with bilateral knee pain and restrictions with daily functional activities. Patient reports progressive onset of pain for months now, states that it has gotten worse recently. Prior to onset of pain she performed workout videos throughout the week along with mowing her yard weekly. Currently she is unable to tolerated prolonged standing/ walking and states that her right knee occasionally harriett causing a fear of falling. Patient demonstrates a decrease in bilateral LE strength,  deficits with balance, and an increase in pain. Patient signs/ symptoms in addition to evaluation findings are consistent with bilateral arthritis. Patient is a good candidate for skilled outpatient physical therapy to address the impairments listed above. Patient rehab potential is good due to PLOF and motivation to return to prior exercise routine. Patient will continue to benefit from skilled PT services to modify and progress therapeutic interventions, address functional mobility deficits, address strength deficits, analyze and cue movement patterns and analyze and modify body mechanics/ergonomics to attain remaining goals. [x]  See Plan of Care  []  See progress note/recertification  []  See Discharge Summary         Progress towards goals / Updated goals:  Short Term Goals: To be met in 5 visits  1. Patient will be independent with HEP in order to maintain gains made with physical therapy. Eval:  Issued   Current:    2. Patient will have decreased pain to 2-3 /10 at worse to increase tolerance to ADLs and job demands. Eval: 5/10   Current:                                                                                                                 3. Patient will be able to SLS for 30 sec bilaterally in order to assist with stair negotiation within home. Eval:   Current:    Long Term Goals: To be accomplished in 10 treatments:  1. Patient will report 0-1/10 pain to aide with increase in activity tolerance and performance within therapy.     Eval:   Current: 2. Patient will increase bilateral knee strength to 5/5 to enable participation in daily functional activities. Eval:   Current:    3. Patient will report at least 50% improvement to allow ease with ADLs and household chores. Eval:   Current:    4. Patient will be able to ascend/ descend 4 steps pain free with one handrail in order to ensure safety with entering/ exiting the home. Eval:   Current:    6. Patient will increase FOTO score to 59 in order to show increase tolerance to community activities.     Eval: 44   Current:          PLAN  [x]  Upgrade activities as tolerated     [x]  Continue plan of care  []  Update interventions per flow sheet       []  Discharge due to:_  []  Other:_      Chandler Lara, PT 8/27/2018  3:56 PM

## 2018-08-27 NOTE — PROGRESS NOTES
[facility] [address], [address] [place] [address], [address] [place] [address] [address]  [phone] fax      Plan of Care/ Statement of Necessity for Physical Therapy Services    Patient name: Abdulaziz Estes Start of Care: 2018   Referral source: Kelli Barrera DO : 1955    Medical Diagnosis: Pain in right knee [M25.561]  Pain in left knee [M25.562]   Onset Date:2018    Treatment Diagnosis: bilateral knee osteoarthritis   Prior Hospitalization: see medical history Provider#: [id number]   Medications: Verified on Patient summary List    Comorbidities: HBP- controlled   Prior Level of Function: Independent with all ADLs, gardening/ mowing the yard, in home exercises     The Plan of Care and following information is based on the information from the initial evaluation. Assessment/ key information: 58year old female patient presents to the clinic with bilateral knee pain and restrictions with daily functional activities. Patient reports progressive onset of pain for months now, states that it has gotten worse recently. Prior to onset of pain she performed workout videos throughout the week along with mowing her yard weekly. Currently she is unable to tolerated prolonged standing/ walking and states that her right knee occasionally harriett causing a fear of falling. Patient demonstrates a decrease in bilateral LE strength,  deficits with balance, and an increase in pain. Patient signs/ symptoms in addition to evaluation findings are consistent with bilateral arthritis. Patient is a good candidate for skilled outpatient physical therapy to address the impairments listed above.  Patient rehab potential is good due to PLOF and motivation to return to prior exercise routine.       Strength (1-5)      Left Right   Hip Flexion 4 4     IR/ER 4- 4-     Abduction 4 4     Adduction 4- 4-   Knee Flexion 4 4     Extension 4- 4-   Ankle Plantarflexion         Dorsiflexion 4+ 4+ Evaluation Complexity History MEDIUM  Complexity : 1-2 comorbidities / personal factors will impact the outcome/ POC ; Examination LOW Complexity : 1-2 Standardized tests and measures addressing body structure, function, activity limitation and / or participation in recreation  ;Presentation LOW Complexity : Stable, uncomplicated  ;Clinical Decision Making MEDIUM Complexity : FOTO score of 26-74  Overall Complexity Rating: LOW   Problem List: pain affecting function, decrease strength, impaired gait/ balance, decrease ADL/ functional abilitiies and decrease activity tolerance   Treatment Plan may include any combination of the following: Therapeutic exercise, Therapeutic activities, Neuromuscular re-education, Physical agent/modality, Gait/balance training, Manual therapy, Patient education, Self Care training, Functional mobility training, Home safety training and Stair training  Patient / Family readiness to learn indicated by: asking questions, trying to perform skills and interest  Persons(s) to be included in education: patient (P)  Barriers to Learning/Limitations: None  Patient Goal (s): Return to exercise routine  Patient Self Reported Health Status: good  Rehabilitation Potential: good    Short Term Goals: To be met in 5 visits  1. Patient will be independent with HEP in order to maintain gains made with physical therapy. Eval:  Issued                        Current:    2. Patient will have decreased pain to 2-3 /10 at worse to increase tolerance to ADLs and job demands. Eval: 5/10                        Current:                                                                                                                 3. Patient will be able to SLS for 30 sec bilaterally in order to assist with stair negotiation within home. Eval:                        Current:    Long Term Goals: To be accomplished in 10 treatments:  1. Patient will report 0-1/10 pain to aide with increase in activity tolerance and performance within therapy. Eval:                        Current:    2. Patient will increase bilateral knee strength to 5/5 to enable participation in daily functional activities. Eval:                        Current:    3. Patient will report at least 50% improvement to allow ease with ADLs and household chores. Eval:                        Current:    4. Patient will be able to ascend/ descend 4 steps pain free with one handrail in order to ensure safety with entering/ exiting the home. Eval:                        Current:    5. Patient will increase FOTO score to 59 in order to show increase tolerance to community activities. Eval: 44                        Current:    Frequency / Duration: Patient to be seen 2 times per week for 6 weeks. Patient/ Caregiver education and instruction: Diagnosis, prognosis, exercises   [x]  Plan of care has been reviewed with NELIDA Lyons, PT 8/27/2018 4:44 PM  ________________________________________________________________________    I certify that the above Therapy Services are being furnished while the patient is under my care. I agree with the treatment plan and certify that this therapy is necessary.     Physician's Signature:____________Date:_________TIME:________    [employer], Date and Time must be completed for valid certification **      Please sign and return to In . Teddy Cali [address] [place] [address], [address] [place] [address] [address]  [phone] fax

## 2018-08-28 ENCOUNTER — HOSPITAL ENCOUNTER (OUTPATIENT)
Dept: LAB | Age: 63
Discharge: HOME | End: 2018-08-28
Payer: COMMERCIAL

## 2018-08-28 LAB
CHOLEST SERPL-MCNC: 182 MG/DL
HDLC SERPL-MCNC: 73 MG/DL (ref 40–60)
HDLC SERPL: 2.5 {RATIO} (ref 0–5)
LDLC SERPL CALC-MCNC: 93.4 MG/DL (ref 0–100)
LIPID PROFILE,FLP: ABNORMAL
TRIGL SERPL-MCNC: 78 MG/DL (ref ?–150)
VLDLC SERPL CALC-MCNC: 15.6 MG/DL

## 2018-08-28 PROCEDURE — 36415 COLL VENOUS BLD VENIPUNCTURE: CPT | Performed by: FAMILY MEDICINE

## 2018-08-28 PROCEDURE — 80061 LIPID PANEL: CPT | Performed by: FAMILY MEDICINE

## 2018-08-30 ENCOUNTER — HOSPITAL ENCOUNTER (OUTPATIENT)
Dept: PHYSICAL THERAPY | Age: 63
Discharge: HOME | End: 2018-08-30
Payer: COMMERCIAL

## 2018-08-30 PROCEDURE — 97110 THERAPEUTIC EXERCISES: CPT

## 2018-08-30 PROCEDURE — 97035 APP MDLTY 1+ULTRASOUND EA 15: CPT

## 2018-08-30 NOTE — PROGRESS NOTES
PT DAILY TREATMENT NOTE - [facility]  Patient Name: Jacquelyn Melchor Date:2018 : 1955 [x]  Patient  Verified Payor: BLUE CROSS / Plan: [facility] / Product Type: PPO / In time:257  Out time:408 Total Treatment Time (min): 64 Total Timed Codes (min)54 
1:1 Treatment Time ( [address] only): 25 Visit #: 2 of 12 Treatment Area: Pain in right knee [M25.561] Pain in left knee [M25.562] SUBJECTIVE Pain Level (0-10 scale): 2 Any medication changes, allergies to medications, adverse drug reactions, diagnosis change, or new procedure performed?: [x] No    [] Yes (see summary sheet for update) Subjective functional status/changes:   [] No changes reported Doing alright. OBJECTIVE Modality rationale: decrease pain and increase tissue extensibility to improve the patients ability to aid with increase tolerance to ADLs and activities Min Type Additional Details  
 [] Estim:  []Unatt       []IFC  []Premod []Other:  []w/ice   []w/heat Position: Location:  
 [] Estim: []Att    []TENS instruct  []NMES []Other:  []w/US   []w/ice   []w/heat Position: Location:  
 []  Traction: [] Cervical       []Lumbar 
                     [] Prone          []Supine []Intermittent   []Continuous Lbs: 
[] before manual 
[] after manual  
16 [x]  Ultrasound: [x]Continuous   [] Pulsed 
                         [x]1MHz   []3MHz W/cm2:1.3 Location:B knees  
 []  Iontophoresis with dexamethasone Location: [] Take home patch  
[] In clinic  
10 []  Ice     [x]  Heat   post[]  Ice massage 
[]  Laser  
[]  Anodyne Position:reclined Location:B knees  
 []  Laser with stim 
[]  Other:  Position: Location:  
 []  Vasopneumatic Device Pressure:       [] lo [] med [] hi  
Temperature: [] lo [] med [] hi  
[] Skin assessment post-treatment:  []intact []redness- no adverse reaction 
  []redness  adverse reaction: min []Eval                  []Re-Eval  
 
 
38 min Therapeutic Exercise:  [x] See flow sheet :  
Rationale: increase ROM, increase strength, improve coordination, improve balance and increase proprioception to improve the patients ability to aid with increase tolerance to ADLS and activities 
 
 min Therapeutic Activity:  []  See flow sheet :  
Rationale:   to improve the patients ability to  
  
 min Neuromuscular Re-education:  []  See flow sheet :  
Rationale:   to improve the patients ability to  
 
 min Manual Therapy:    
Rationale:  to  
 
 min Gait Training:  ___ feet with ___ device on level surfaces with ___ level of assist  
Rationale: With 
 [] TE 
 [] TA 
 [] neuro 
 [] other: Patient Education: [x] Review HEP [] Progressed/Changed HEP based on:  
[] positioning   [] body mechanics   [] transfers   [] heat/ice application   
[] other:   
 
Other Objective/Functional Measures: VC exercises and tech. Pain Level (0-10 scale) post treatment: <2 
 
ASSESSMENT/Changes in Function: tolerated well. Returns for first full day back. Patient will continue to benefit from skilled PT services to modify and progress therapeutic interventions, address functional mobility deficits, address ROM deficits, address strength deficits, analyze and address soft tissue restrictions, analyze and cue movement patterns, analyze and modify body mechanics/ergonomics, assess and modify postural abnormalities and instruct in home and community integration to attain remaining goals. [x]  See Plan of Care 
[]  See progress note/recertification 
[]  See Discharge Summary Progress towards goals / Updated goals: 
Short Term Goals: To be met in 5 visits 1. Patient will be independent with HEP in order to maintain gains made with physical therapy. 
                      Eval:  Issued 
                      Current:  progressing 8/30/18 2.  Patient will have decreased pain to 2-3 /10 at worse to increase tolerance to ADLs and job demands.  
                      Eval: 5/10 
                      Current:    2  8/30/18                                                                                                             
3. Patient will be able to SLS for 30 sec bilaterally in order to assist with stair negotiation within home.  
                      Eval: 
                      Current:   
Long Term Goals: To be accomplished in 10 treatments: 1. Patient will report 0-1/10 pain to aide with increase in activity tolerance and performance within therapy.  
                      Eval: 
                      Current:  2 8/30/18 2. Patient will increase bilateral knee strength to 5/5 to enable participation in daily functional activities.                       Eval: 
                      Current:   
3. Patient will report at least 50% improvement to allow ease with ADLs and household chores.  
                      Eval: 
                      Current:   
4. Patient will be able to ascend/ descend 4 steps pain free with one handrail in order to ensure safety with entering/ exiting the home.  
                      Eval: 
                      Current:   
5. Patient will increase FOTO score to 59 in order to show increase tolerance to community activities.                       Eval: 44 
                      Current:   
 
PLAN [x]  Upgrade activities as tolerated     [x]  Continue plan of care 
[]  Update interventions per flow sheet      
[]  Discharge due to:_ 
[]  Other:_ Orien Severs, PT 8/30/2018  2:55 PM 
 
Future Appointments Date Time Provider Mag Cobian 8/30/2018 3:00 PM Orien Severs, PT [facility]  
9/5/2018 11:00 AM Baljeet Melgar PT [facility]  
9/7/2018 10:30 AM Gerda Darby PT [facility]  
9/11/2018 11:00 AM Orien Severs, PT [facility]  
9/14/2018 11:30 AM [facility] Dar[facility]  
9/18/2018 11:30 AM Gerda Darby PT [facility]  
 9/21/2018 11:30 AM Margie Enciso PT [facility]

## 2018-09-05 ENCOUNTER — HOSPITAL ENCOUNTER (OUTPATIENT)
Dept: PHYSICAL THERAPY | Age: 63
Discharge: HOME | End: 2018-09-05
Payer: COMMERCIAL

## 2018-09-05 PROCEDURE — 97110 THERAPEUTIC EXERCISES: CPT

## 2018-09-05 NOTE — PROGRESS NOTES
PT DAILY TREATMENT NOTE - [facility]  Patient Name: Hema Jones Date:2018 : 1955 [x]  Patient  Verified Payor: BLUE CROSS / [place]: [facility] [place] [employer] / Product Type: PPO / In time: 11:02   Out time: 12:01 Total Treatment Time (min): 59 Total Timed Codes (min): 59 
1:1 Treatment Time ( [address] only): 32 Visit #: 3 of 12 Treatment Area: Pain in right knee [M25.561] Pain in left knee [M25.562] SUBJECTIVE Pain Level (0-10 scale): 4 Any medication changes, allergies to medications, adverse drug reactions, diagnosis change, or new procedure performed?: [x] No    [] Yes (see summary sheet for update) Subjective functional status/changes:   [] No changes reported Pt reports having some increased pain secondary to doing a lot of driving. Pt reported having no change in pain/syptoms after US on B knees last session. OBJECTIVE Modality rationale: decrease pain and increase tissue extensibility to improve the patients ability to aid with increase tolerance to ADLs Min Type Additional Details  
 [] Estim:  []Unatt       []IFC  []Premod []Other:  []w/ice   []w/heat Position: Location:  
 [] Estim: []Att    []TENS instruct  []NMES []Other:  []w/US   []w/ice   []w/heat Position: Location:  
 []  Traction: [] Cervical       []Lumbar 
                     [] Prone          []Supine []Intermittent   []Continuous Lbs: 
[] before manual 
[] after manual  
 []  Ultrasound: []Continuous   [] Pulsed []1MHz   []3MHz W/cm2: 
Location:  
 []  Iontophoresis with dexamethasone Location: [] Take home patch  
[] In clinic  
10 []  Ice     [x]  Heat    
[]  Ice massage 
[]  Laser  
[]  Anodyne Position: reclined Location: B knees  
 []  Laser with stim 
[]  Other:  Position: Location:  
 []  Vasopneumatic Device Pressure:       [] lo [] med [] hi  
Temperature: [] lo [] med [] hi  
 [] Skin assessment post-treatment:  []intact []redness- no adverse reaction 
  []redness  adverse reaction:  
 
49 min Therapeutic Exercise:  [x] See flow sheet :  
Rationale: increase ROM, increase strength, improve coordination, improve balance and increase proprioception to improve the patients ability to aid with increase tolerance to ADLS and activities With 
 [] TE 
 [] TA 
 [] neuro 
 [] other: Patient Education: [x] Review HEP [] Progressed/Changed HEP based on:  
[] positioning   [] body mechanics   [] transfers   [] heat/ice application   
[] other:   
 
Other Objective/Functional Measures: Attempted a few reps of step ups on the 6 inch step but pt states that this is harder to perform than with the 4 inch steps and uses more UE support. Increased reps per flow sheet to improve strength and endurance in the LEs. Pain Level (0-10 scale) post treatment: 3 
 
ASSESSMENT/Changes in Function: Unable to fully EXT the left knee with LAQ exercise. Decreased pain reported post session. Pt continues to have limitations with mobility, ROM, and strength in the LEs. Continue POC as tolerated. Patient will continue to benefit from skilled PT services to modify and progress therapeutic interventions, address functional mobility deficits, address ROM deficits, address strength deficits, analyze and address soft tissue restrictions, analyze and cue movement patterns, analyze and modify body mechanics/ergonomics, assess and modify postural abnormalities and instruct in home and community integration to attain remaining goals. []  See Plan of Care 
[]  See progress note/recertification 
[]  See Discharge Summary Progress towards goals / Updated goals: 
Short Term Goals: To be met in 5 visits 1. Patient will be independent with HEP in order to maintain gains made with physical therapy. 
                      Eloisa:  Issued 
                      Current:  progressing 8/30/18 2. Patient will have decreased pain to 2-3 /10 at worse to increase tolerance to ADLs and job demands.  
                      Eval: 5/10 
                      Current:    2  8/30/18                                                                                                             
3. Patient will be able to SLS for 30 sec bilaterally in order to assist with stair negotiation within home.  
                      Eval: 
                      Current:   
Long Term Goals: To be accomplished in 10 treatments: 1. Patient will report 0-1/10 pain to aide with increase in activity tolerance and performance within therapy.  
                      Eval: 
                      Current:  2 8/30/18 2. Patient will increase bilateral knee strength to 5/5 to enable participation in daily functional activities.                       Eval: 
                      Current:   
3. Patient will report at least 50% improvement to allow ease with ADLs and household chores.  
                      Eval: 
                      Current:   
4. Patient will be able to ascend/ descend 4 steps pain free with one handrail in order to ensure safety with entering/ exiting the home.  
                      Eval: 
                      Current:   
5. Patient will increase FOTO score to 59 in order to show increase tolerance to community activities.                       Eval: 44 
                      Current:   
 
PLAN [x]  Upgrade activities as tolerated     [x]  Continue plan of care [x]  Update interventions per flow sheet      
[]  Discharge due to:_ 
[]  Other:_ Thao Martínez, PT 9/5/2018  11:15 AM 
 
Future Appointments Date Time Provider Mag Cobian 9/5/2018 11:00 AM Thao Martínez, PT [facility]  
9/7/2018 10:30 AM Roselyn Cuellar, PT [facility]  
9/11/2018 11:00 AM Chase Veloz, PT [facility]  
9/14/2018 11:30 AM Roselyn Cuellar, PT [facility]  
9/18/2018 11:30 AM Roselyn Sutton, PT [facility]  
 9/21/2018 11:30 AM Ildefonso Brown PT [facility]

## 2018-09-07 ENCOUNTER — HOSPITAL ENCOUNTER (OUTPATIENT)
Dept: PHYSICAL THERAPY | Age: 63
Discharge: HOME | End: 2018-09-07
Payer: COMMERCIAL

## 2018-09-07 PROCEDURE — 97110 THERAPEUTIC EXERCISES: CPT

## 2018-09-07 NOTE — PROGRESS NOTES
PT DAILY TREATMENT NOTE - [facility]  Patient Name: Clarisa Huerta Date:2018 : 1955 [x]  Patient  Verified Payor: BLUE CROSS / Plan: [facility] [place] [facility] / Product Type: PPO / In time:10:32  Out time:11:26 Total Treatment Time (min): 54 Total Timed Codes (min): 44 
1:1 Treatment Time ( only): 44 Visit #: 4 of 10 Treatment Area: Pain in right knee [M25.561] Pain in left knee [M25.562] SUBJECTIVE Pain Level (0-10 scale): 3.5 Any medication changes, allergies to medications, adverse drug reactions, diagnosis change, or new procedure performed?: [x] No    [] Yes (see summary sheet for update) Subjective functional status/changes:   [] No changes reported Patient reports doing a lot of yard work yesterday and that she is feeling sore this morning. OBJECTIVE Modality rationale: decrease pain and increase tissue extensibility to improve the patients ability to ease with tolerance to ADLs Min Type Additional Details  
 [] Estim:  []Unatt       []IFC  []Premod []Other:  []w/ice   []w/heat Position: Location:  
 [] Estim: []Att    []TENS instruct  []NMES []Other:  []w/US   []w/ice   []w/heat Position: Location:  
 []  Traction: [] Cervical       []Lumbar 
                     [] Prone          []Supine []Intermittent   []Continuous Lbs: 
[] before manual 
[] after manual  
 []  Ultrasound: []Continuous   [] Pulsed []1MHz   []3MHz W/cm2: 
Location:  
 []  Iontophoresis with dexamethasone Location: [] Take home patch  
[] In clinic  
10 []  Ice     [x]  heat 
[]  Ice massage 
[]  Laser  
[]  Anodyne Position: sitting Location: bilateral knees  
 []  Laser with stim 
[]  Other:  Position: Location:  
 []  Vasopneumatic Device Pressure:       [] lo [] med [] hi  
Temperature: [] lo [] med [] hi  
[] Skin assessment post-treatment:  []intact []redness- no adverse reaction []redness  adverse reaction:  
 
 
44 min Therapeutic Exercise:  [x] See flow sheet :  
Rationale: increase strength, improve balance and increase proprioception to improve the patients ability to perform daily household chores. With 
 [] TE 
 [] TA 
 [] neuro 
 [] other: Patient Education: [x] Review HEP [] Progressed/Changed HEP based on:  
[] positioning   [] body mechanics   [] transfers   [] heat/ice application   
[] other:   
 
Other Objective/Functional Measures:  
Able to perform all therex with minimal changes in symptoms States that the SB squats are the most difficult and she has some pain with them Pain Level (0-10 scale) post treatment: 2 
 
ASSESSMENT/Changes in Function: Patient continues to show improvements with signs/ symptoms however still demonstrates a decrease in strength, deficits with balance and an increase in pain with functional activities. Patient will continue to benefit from skilled PT services to modify and progress therapeutic interventions, address functional mobility deficits, address strength deficits, analyze and cue movement patterns and analyze and modify body mechanics/ergonomics to attain remaining goals. [x]  See Plan of Care 
[]  See progress note/recertification 
[]  See Discharge Summary Progress towards goals / Updated goals: 
Short Term Goals: To be met in 5 visits 1. Patient will be independent with HEP in order to maintain gains made with physical therapy. 
                      Eval:  Issued 
                      Current:  progressing 8/30/18 2. Patient will have decreased pain to 2-3 /10 at worse to increase tolerance to ADLs and job demands.  
                      Eval: 5/10 
                      Current:    2  8/30/18                                                                                                             
3.  Patient will be able to SLS for 30 sec bilaterally in order to assist with stair negotiation within home.  
                      Eval: 
                      Current:   
Long Term Goals: To be accomplished in 10 treatments: 1. Patient will report 0-1/10 pain to aide with increase in activity tolerance and performance within therapy.  
                      Eval: 
                      Current:  2 8/30/18 2. Patient will increase bilateral knee strength to 5/5 to enable participation in daily functional activities.                       Eval: 
                      Current:   
3. Patient will report at least 50% improvement to allow ease with ADLs and household chores.  
                      Eval: 
                      Current:   
4. Patient will be able to ascend/ descend 4 steps pain free with one handrail in order to ensure safety with entering/ exiting the home.  
                      Eval: 
                      Current:   
5. Patient will increase FOTO score to 59 in order to show increase tolerance to community activities.                       Eval: 44 
                      Current:   
 
PLAN [x]  Upgrade activities as tolerated     [x]  Continue plan of care 
[]  Update interventions per flow sheet      
[]  Discharge due to:_ 
[]  Other:_ Catalina Casper PT 9/7/2018  7:52 AM 
 
Future Appointments Date Time Provider Mag Cobian 9/7/2018 10:30 AM Catalina Casper PT [facility]  
9/11/2018 11:00 AM Maico Figueroa PT [facility]  
9/14/2018 11:30 AM Catalina Casper PT [facility]  
9/18/2018 11:30 AM Catalina Casper PT [facility]  
9/21/2018 11:30 AM Catalina Casper PT [facility]

## 2018-09-11 ENCOUNTER — APPOINTMENT (OUTPATIENT)
Dept: PHYSICAL THERAPY | Age: 63
End: 2018-09-11
Payer: COMMERCIAL

## 2018-09-14 ENCOUNTER — HOSPITAL ENCOUNTER (OUTPATIENT)
Dept: PHYSICAL THERAPY | Age: 63
Discharge: HOME | End: 2018-09-14
Payer: COMMERCIAL

## 2018-09-14 PROCEDURE — 97110 THERAPEUTIC EXERCISES: CPT

## 2018-09-14 NOTE — PROGRESS NOTES
PT DAILY TREATMENT NOTE - [facility]  Patient Name: Chandni Haider Date:2018 : 1955 [x]  Patient  Verified Payor: BLUE CROSS / [place]: [place] / Product Type: PPO / In time: 11:35   Out time: 12:36 Total Treatment Time (min): 61 Total Timed Codes (min): 51 
1:1 Treatment Time ([facility] only): 29 Visit #: 5 of 10 Treatment Area: Pain in right knee [M25.561] Pain in left knee [M25.562] SUBJECTIVE Pain Level (0-10 scale): left 5, right 3 Any medication changes, allergies to medications, adverse drug reactions, diagnosis change, or new procedure performed?: [x] No    [] Yes (see summary sheet for update) Subjective functional status/changes:   [] No changes reported Pt states she woke up with increased pain/stiffness in the left knee the morning after the last session. Pt reports she was limping and dragging her left leg since and cancelled her appointment earlier this week because of this pain. Pt reports that this pain is better than it was Saturday but she still has pain and limited motion in the left knee. Pt reports she feels it in the right knee as well but it is not as bad as the left. Pt states she has been using ice/heat at home, tylenol, and Voltaren gel (on both knees) to try to help with the pain. Pt denies having pain during the last session or that night but exercises were increased last session and may be correlated to her pain today. OBJECTIVE Modality rationale: decrease pain and increase tissue extensibility to improve the patients ability to ease with tolerance to ADLs Min Type Additional Details  
 [] Estim:  []Unatt       []IFC  []Premod []Other:  []w/ice   []w/heat Position: Location:  
 [] Estim: []Att    []TENS instruct  []NMES []Other:  []w/US   []w/ice   []w/heat Position: Location:  
 []  Traction: [] Cervical       []Lumbar 
                     [] Prone          []Supine []Intermittent   []Continuous Lbs: 
[] before manual 
[] after manual  
 []  Ultrasound: []Continuous   [] Pulsed []1MHz   []3MHz W/cm2: 
Location:  
 []  Iontophoresis with dexamethasone Location: [] Take home patch  
[] In clinic  
10 []  Ice     [x]  heat 
[]  Ice massage 
[]  Laser  
[]  Anodyne Position: reclined Location: B knees  
 []  Laser with stim 
[]  Other:  Position: Location:  
 []  Vasopneumatic Device Pressure:       [] lo [] med [] hi  
Temperature: [] lo [] med [] hi  
[] Skin assessment post-treatment:  []intact []redness- no adverse reaction 
  []redness  adverse reaction:  
 
51 min Therapeutic Exercise:  [x] See flow sheet :  
Rationale: increase strength, improve balance and increase proprioception to improve the patients ability to perform daily household chores. With 
 [x] TE 
 [] TA 
 [] neuro 
 [] other: Patient Education: [x] Review HEP [] Progressed/Changed HEP based on:  
[] positioning   [] body mechanics   [] transfers   [] heat/ice application   
[x] other: see assessment below. Other Objective/Functional Measures: Visible non-pitting swelling noted on the left knee, proximal left knee, and reports soreness in the left distal quad region. No signs/symptoms of DVT noted in the left LE. Mild warmth noted in the left knee with palpation but this was performed exercise exercise. Pain Level (0-10 scale) post treatment: 3 left, 1 right ASSESSMENT/Changes in Function: Held/Regressed some exercises today secondary to pt's subjective comments. Educated pt that heat pack is good for arthritis and stiffness, and a cold pack is good for swelling and elevated pain. Pt stated she would like to try the heat today post session to improve stiffness in the B knees. Reported improvement in pain in both knees post session.  Educated pt to monitor for elevated swelling and to avoid heat at home if the swelling increases and to then try ice. Continue POC as tolerated. Patient will continue to benefit from skilled PT services to modify and progress therapeutic interventions, address functional mobility deficits, address ROM deficits, address strength deficits, analyze and address soft tissue restrictions, analyze and cue movement patterns, analyze and modify body mechanics/ergonomics, address imbalance/dizziness and instruct in home and community integration to attain remaining goals. []  See Plan of Care 
[]  See progress note/recertification 
[]  See Discharge Summary Progress towards goals / Updated goals: 
Short Term Goals: To be met in 5 visits 1. Patient will be independent with HEP in order to maintain gains made with physical therapy. 
                      Eval:  Issued 
                      Current:  progressing 8/30/18 2. Patient will have decreased pain to 2-3 /10 at worse to increase tolerance to ADLs and job demands.  
                      Eval: 5/10 
                      Current:    elevated pain reported today 9/14/2018                                                                                                            
3. Patient will be able to SLS for 30 sec bilaterally in order to assist with stair negotiation within home.  
                      Eval: 
                      Current:   
Long Term Goals: To be accomplished in 10 treatments: 1. Patient will report 0-1/10 pain to aide with increase in activity tolerance and performance within therapy.  
                      Eval: 
                      Current:  elevated pain reported today 9/14/2018 2. Patient will increase bilateral knee strength to 5/5 to enable participation in daily functional activities.                       Eval: 
                      Current:   
3. Patient will report at least 50% improvement to allow ease with ADLs and household chores.                       Eval: 
                      Current:   
4. Patient will be able to ascend/ descend 4 steps pain free with one handrail in order to ensure safety with entering/ exiting the home.  
                      Eval: 
                      Current:   
5. Patient will increase FOTO score to 59 in order to show increase tolerance to community activities.                       Eval: 44 
                      Current:   
 
PLAN [x]  Upgrade activities as tolerated     [x]  Continue plan of care [x]  Update interventions per flow sheet      
[]  Discharge due to:_ 
[]  Other:_ Roni Berkowitz PT 9/14/2018  11:55 AM 
 
Future Appointments Date Time Provider Mag Cobian 9/18/2018 11:30 [facility] [address]  
9/21/2018 11:30 AM [facility] [address]

## 2018-09-18 ENCOUNTER — HOSPITAL ENCOUNTER (OUTPATIENT)
Dept: PHYSICAL THERAPY | Age: 63
Discharge: HOME | End: 2018-09-18
Payer: COMMERCIAL

## 2018-09-18 PROCEDURE — 97110 THERAPEUTIC EXERCISES: CPT

## 2018-09-18 NOTE — PROGRESS NOTES
PT DAILY TREATMENT NOTE - [facility]  Patient Name: Shaji Britton Date:2018 : 1955 [x]  Patient  Verified Payor: BLUE CROSS / [place]: [place] [facility] / Product Type: PPO / In time:11:36  Out time:12:22 Total Treatment Time (min): 48 Total Timed Codes (min): 38 
1:1 Treatment Time ( only): 38 Visit #: 6 of 10 Treatment Area: Pain in right knee [M25.561] Pain in left knee [M25.562] SUBJECTIVE Pain Level (0-10 scale): 4- left leg Any medication changes, allergies to medications, adverse drug reactions, diagnosis change, or new procedure performed?: [x] No    [] Yes (see summary sheet for update) Subjective functional status/changes:   [] No changes reported States her left leg is bothering her more than the right OBJECTIVE Modality rationale: decrease pain and increase tissue extensibility to improve the patients ability to ease with tolerance to ADLs Min Type Additional Details  
 [] Estim:  []Unatt       []IFC  []Premod []Other:  []w/ice   []w/heat Position: Location:  
 [] Estim: []Att    []TENS instruct  []NMES []Other:  []w/US   []w/ice   []w/heat Position: Location:  
 []  Traction: [] Cervical       []Lumbar 
                     [] Prone          []Supine []Intermittent   []Continuous Lbs: 
[] before manual 
[] after manual  
 []  Ultrasound: []Continuous   [] Pulsed []1MHz   []3MHz W/cm2: 
Location:  
 []  Iontophoresis with dexamethasone Location: [] Take home patch  
[] In clinic  
10 []  Ice     [x]  heat 
[]  Ice massage 
[]  Laser  
[]  Anodyne Position: sitting Location: bilateral knees  
 []  Laser with stim 
[]  Other:  Position: Location:  
 []  Vasopneumatic Device Pressure:       [] lo [] med [] hi  
Temperature: [] lo [] med [] hi  
[] Skin assessment post-treatment:  []intact []redness- no adverse reaction []redness  adverse reaction:  
 
 
 
38 min Therapeutic Exercise:  [x] See flow sheet :  
Rationale: increase ROM, increase strength, improve balance and increase proprioception to improve the patients ability to perform daily household chores. With 
 [] TE 
 [] TA 
 [] neuro 
 [] other: Patient Education: [x] Review HEP [] Progressed/Changed HEP based on:  
[] positioning   [] body mechanics   [] transfers   [] heat/ice application   
[] other:   
 
Other Objective/Functional Measures:  
Able to perform 4inch step up 10 x leading with left LE- reports of discomfort after 10 
15 sep ups on R LE with no reports of discomfort/ pain Pain Level (0-10 scale) post treatment: <4  
 
ASSESSMENT/Changes in Function: Patient continues to show improvements with signs/ symptoms however still demonstrates a decrease in strength, impaired ROM, deficits with balance and an increase in pain with functional activities. Patient will continue to benefit from skilled PT services to modify and progress therapeutic interventions, address functional mobility deficits, address strength deficits, analyze and cue movement patterns and analyze and modify body mechanics/ergonomics to attain remaining goals. [x]  See Plan of Care 
[]  See progress note/recertification 
[]  See Discharge Summary Progress towards goals / Updated goals: 
Short Term Goals: To be met in 5 visits 1. Patient will be independent with HEP in order to maintain gains made with physical therapy. 
                      Eval:  Issued 
                      Current:  progressing 8/30/18 2.  Patient will have decreased pain to 2-3 /10 at worse to increase tolerance to ADLs and job demands.  
                      Eval: 5/10 
                      Current:    elevated pain reported today 9/14/2018                                                                                                            
 3. Patient will be able to SLS for 30 sec bilaterally in order to assist with stair negotiation within home.  
                      Eval: 
                      Current:   
Long Term Goals: To be accomplished in 10 treatments: 1. Patient will report 0-1/10 pain to aide with increase in activity tolerance and performance within therapy.  
                      Eval: 
                      Current:  elevated pain reported today 9/14/2018, 4 (9/18/18) 2. Patient will increase bilateral knee strength to 5/5 to enable participation in daily functional activities.                       Eval: 
                      Current:   
3. Patient will report at least 50% improvement to allow ease with ADLs and household chores.  
                      Eval: 
                      Current:   
4. Patient will be able to ascend/ descend 4 steps pain free with one handrail in order to ensure safety with entering/ exiting the home.  
                      Eval: 
                      Current:   
5. Patient will increase FOTO score to 59 in order to show increase tolerance to community activities.                       Eval: 44 
                      Current:   
 
PLAN [x]  Upgrade activities as tolerated     [x]  Continue plan of care 
[]  Update interventions per flow sheet      
[]  Discharge due to:_ 
[]  Other:_ Bonita Mayfield PT 9/18/2018  7:55 AM 
 
Future Appointments Date Time Provider Mag Cobian 9/18/2018 11:30 AM Bonita Mayfield PT [facility]  
9/21/2018 11:30 AM Bonita Mayfield PT [facility]

## 2018-09-21 ENCOUNTER — HOSPITAL ENCOUNTER (OUTPATIENT)
Dept: PHYSICAL THERAPY | Age: 63
Discharge: HOME | End: 2018-09-21
Payer: COMMERCIAL

## 2018-09-21 PROCEDURE — 97110 THERAPEUTIC EXERCISES: CPT

## 2018-09-21 NOTE — PROGRESS NOTES
PT DAILY TREATMENT NOTE - [facility]  Patient Name: Duncan Melchor Date:2018 : 1955 [x]  Patient  Verified Payor: BLUE CROSS / [place]: [place] [facility] / Product Type: PPO / In time:11:34  Out time:12:24 Total Treatment Time (min): 50 Total Timed Codes (min): 40 
1:1 Treatment Time ( only): 30 Visit #: 7 of 10 Treatment Area: Pain in right knee [M25.561] Pain in left knee [M25.562] SUBJECTIVE Pain Level (0-10 scale): L3 R3 Any medication changes, allergies to medications, adverse drug reactions, diagnosis change, or new procedure performed?: [x] No    [] Yes (see summary sheet for update) Subjective functional status/changes:   [] No changes reported Reports beginning to increase her activity over the week with no changes in symptoms. OBJECTIVE Modality rationale: decrease pain to improve the patients ability to ease with ADLs Min Type Additional Details  
 [] Estim:  []Unatt       []IFC  []Premod []Other:  []w/ice   []w/heat Position: Location:  
 [] Estim: []Att    []TENS instruct  []NMES []Other:  []w/US   []w/ice   []w/heat Position: Location:  
 []  Traction: [] Cervical       []Lumbar 
                     [] Prone          []Supine []Intermittent   []Continuous Lbs: 
[] before manual 
[] after manual  
 []  Ultrasound: []Continuous   [] Pulsed []1MHz   []3MHz W/cm2: 
Location:  
 []  Iontophoresis with dexamethasone Location: [] Take home patch  
[] In clinic  
10 []  Ice     [x]  heat 
[]  Ice massage 
[]  Laser  
[]  Anodyne Position: sitting Location: bilateral knees  
 []  Laser with stim 
[]  Other:  Position: Location:  
 []  Vasopneumatic Device Pressure:       [] lo [] med [] hi  
Temperature: [] lo [] med [] hi  
[] Skin assessment post-treatment:  []intact []redness- no adverse reaction 
  []redness  adverse reaction: 40 min Therapeutic Exercise:  [x] See flow sheet :  
Rationale: increase strength, improve coordination, improve balance and increase proprioception to improve the patients ability to perform daily household chores. With 
 [] TE 
 [] TA 
 [] neuro 
 [] other: Patient Education: [x] Review HEP [] Progressed/Changed HEP based on:  
[] positioning   [] body mechanics   [] transfers   [] heat/ice application   
[] other:   
 
Other Objective/Functional Measures:  
Able to perform all therex with no changes in symptoms Require verbal cueing to stay on task Pain Level (0-10 scale) post treatment:\" better\" ASSESSMENT/Changes in Function: Patient continues to show improvements with signs/ symptoms however still demonstrates a decrease in strength, deficits with balance and an increase in pain with functional activities. Patient will continue to benefit from skilled PT services to modify and progress therapeutic interventions, address functional mobility deficits, address strength deficits, analyze and cue movement patterns and analyze and modify body mechanics/ergonomics to attain remaining goals. [x]  See Plan of Care 
[]  See progress note/recertification 
[]  See Discharge Summary Progress towards goals / Updated goals: 
Short Term Goals: To be met in 5 visits 1. Patient will be independent with HEP in order to maintain gains made with physical therapy. 
                      Eval:  Issued 
                      Current:  progressing 8/30/18 2. Patient will have decreased pain to 2-3 /10 at worse to increase tolerance to ADLs and job demands.  
                      Eval: 5/10 
                      Current:    elevated pain reported today 9/14/2018                                                                                                            
3. Patient will be able to SLS for 30 sec bilaterally in order to assist with stair negotiation within home.                       Eval: 
                      Current:   
Long Term Goals: To be accomplished in 10 treatments: 1. Patient will report 0-1/10 pain to aide with increase in activity tolerance and performance within therapy.  
                      Eval: 
                      Current:  elevated pain reported today 9/14/2018, 4 (9/18/18) 2. Patient will increase bilateral knee strength to 5/5 to enable participation in daily functional activities.                       Eval: 
                      Current:   
3. Patient will report at least 50% improvement to allow ease with ADLs and household chores.  
                      Eval: 
                      Current:   
4. Patient will be able to ascend/ descend 4 steps pain free with one handrail in order to ensure safety with entering/ exiting the home.  
                      Eval: 
                      Current:   
5. Patient will increase FOTO score to 59 in order to show increase tolerance to community activities.                       Eval: 44 
                      Current:   
 
PLAN [x]  Upgrade activities as tolerated     [x]  Continue plan of care 
[]  Update interventions per flow sheet      
[]  Discharge due to:_ 
[]  Other:_ Antonio Tapia PT 9/21/2018  7:53 AM 
 
Future Appointments Date Time Provider Mag Cobian 9/21/2018 11:30 AM Antonio Tapia PT [facility]  
9/25/2018 10:30 AM [facility]  
9/27/2018 10:30 AM Da Hicks PT [facility]

## 2018-09-25 ENCOUNTER — HOSPITAL ENCOUNTER (OUTPATIENT)
Dept: PHYSICAL THERAPY | Age: 63
Discharge: HOME | End: 2018-09-25
Payer: COMMERCIAL

## 2018-09-25 PROCEDURE — 97035 APP MDLTY 1+ULTRASOUND EA 15: CPT

## 2018-09-25 PROCEDURE — 97110 THERAPEUTIC EXERCISES: CPT

## 2018-09-25 NOTE — PROGRESS NOTES
PT DAILY TREATMENT NOTE - [facility]  Patient Name: Duncan Melchor Date:2018 : 1955 [x]  Patient  Verified Payor: BLUE CROSS / [place]: [place] [facility] / Product Type: PPO / In FOWR:1512  Out time:1146 Total Treatment Time (min): 64 Total Timed Codes (min): 54 
1:1 Treatment Time ( only): 30 Visit #: 8 of 10 Treatment Area: Pain in right knee [M25.561] Pain in left knee [M25.562] SUBJECTIVE Pain Level (0-10 scale): 3 Any medication changes, allergies to medications, adverse drug reactions, diagnosis change, or new procedure performed?: [x] No    [] Yes (see summary sheet for update) Subjective functional status/changes:   [] No changes reported I think I overdid it in the yard and garden. I really pushed myself . OBJECTIVE Modality rationale: decrease pain and increase tissue extensibility to improve the patients ability to aid with increase tolerance to ADLs and activities Min Type Additional Details  
 [] Estim:  []Unatt       []IFC  []Premod []Other:  []w/ice   []w/heat Position: Location:  
 [] Estim: []Att    []TENS instruct  []NMES []Other:  []w/US   []w/ice   []w/heat Position: Location:  
 []  Traction: [] Cervical       []Lumbar 
                     [] Prone          []Supine []Intermittent   []Continuous Lbs: 
[] before manual 
[] after manual  
16 [x]  Ultrasound: [x]Continuous   [] Pulsed 
                         [x]1MHz   []3MHz W/cm2:1.3 Location:B Knees  
 []  Iontophoresis with dexamethasone Location: [] Take home patch  
[] In clinic  
10 []  Ice     [x]  Heat post  
[]  Ice massage 
[]  Laser  
[]  Anodyne Position:reclined Location: B knees  
 []  Laser with stim 
[]  Other:  Position: Location:  
 []  Vasopneumatic Device Pressure:       [] lo [] med [] hi  
Temperature: [] lo [] med [] hi  
[] Skin assessment post-treatment:  []intact []redness- no adverse reaction 
  []redness  adverse reaction:  
 
 min []Eval                  []Re-Eval  
 
 
38 min Therapeutic Exercise:  [x] See flow sheet :  
Rationale: increase ROM, increase strength, improve coordination, improve balance and increase proprioception to improve the patients ability to aid with increase tolerance to ADLS and activities 
 
 min Therapeutic Activity:  []  See flow sheet :  
Rationale:   to improve the patients ability to  
  
 min Neuromuscular Re-education:  []  See flow sheet :  
Rationale:   to improve the patients ability to  
 
 min Manual Therapy:    
Rationale:  to  
 
 min Gait Training:  ___ feet with ___ device on level surfaces with ___ level of assist  
Rationale: With 
 [] TE 
 [] TA 
 [] neuro 
 [] other: Patient Education: [x] Review HEP [] Progressed/Changed HEP based on:  
[] positioning   [] body mechanics   [] transfers   [] heat/ice application   
[] other:   
 
Other Objective/Functional Measures: VC exercises and technique Pain Level (0-10 scale) post treatment: <3 
 
ASSESSMENT/Changes in Function: tolerated well. Patient will continue to benefit from skilled PT services to modify and progress therapeutic interventions, address functional mobility deficits, address ROM deficits, address strength deficits, analyze and address soft tissue restrictions, analyze and cue movement patterns, analyze and modify body mechanics/ergonomics, assess and modify postural abnormalities and instruct in home and community integration to attain remaining goals. [x]  See Plan of Care 
[]  See progress note/recertification 
[]  See Discharge Summary Progress towards goals / Updated goals: 
 Short Term Goals: To be met in 5 visits 1. Patient will be independent with HEP in order to maintain gains made with physical therapy. 
                      Eval:  Issued 
                      Current:  progressing 8/30/18 9/25/18 2. Patient will have decreased pain to 2-3 /10 at worse to increase tolerance to ADLs and job demands.  
                      Eval: 5/10 
                      Current:    elevated pain reported today 9/14/2018       3 9/25/18 
                                                                                                     
3. Patient will be able to SLS for 30 sec bilaterally in order to assist with stair negotiation within home.  
                      Eval: 
                      Current:   not initiated 9/25/18 Long Term Goals: To be accomplished in 10 treatments: 1. Patient will report 0-1/10 pain to aide with increase in activity tolerance and performance within therapy.  
                      Eval: 
                      Current:  elevated pain reported today 9/14/2018, 4 (9/18/18)     3 9/25/18 2. Patient will increase bilateral knee strength to 5/5 to enable participation in daily functional activities.                       Eval: 
                      Current:   
3. Patient will report at least 50% improvement to allow ease with ADLs and household chores.  
                      Eval: 
                      Current:  no % although reports improvement 9/25/18 4. Patient will be able to ascend/ descend 4 steps pain free with one handrail in order to ensure safety with entering/ exiting the home.  
                      Eval: 
                      Current:   
5. Patient will increase FOTO score to 59 in order to show increase tolerance to community activities.                       Eval: 44 
                      Current:  recheck next session   
  
 
PLAN [x]  Upgrade activities as tolerated     [x]  Continue plan of care 
[]  Update interventions per flow sheet      
[]  Discharge due to:_ 
[x]  Other:_ recheck FOTO and goals Alli Blunt PT 9/25/2018  10:39 AM 
 
Future Appointments Date Time Provider Mag Cobian 9/27/2018 10:30 AM Alli lBunt PT [facility]

## 2018-09-27 ENCOUNTER — APPOINTMENT (OUTPATIENT)
Dept: PHYSICAL THERAPY | Age: 63
End: 2018-09-27
Payer: COMMERCIAL

## 2018-09-28 ENCOUNTER — HOSPITAL ENCOUNTER (OUTPATIENT)
Dept: PHYSICAL THERAPY | Age: 63
Discharge: HOME | End: 2018-09-28
Payer: COMMERCIAL

## 2018-09-28 PROCEDURE — 97110 THERAPEUTIC EXERCISES: CPT

## 2018-09-28 NOTE — PROGRESS NOTES
[address], [address] [place], [address] [place], [place] [address] [address] 
[phone] fax Progress Note Patient name: Jojo Murrell Start of Care: 2018 Referral source: Traci Rivera DO : 1955 Medical Diagnosis: Pain in right knee [M25.561] Pain in left knee [M25.562] Onset Date:2018 Treatment Diagnosis: bilateral knee osteoarthritis Prior Hospitalization: see medical history Provider#: [id number] Medications: Verified on Patient summary List  
 Comorbidities: HBP- controlled Prior Level of Function: Independent with all ADLs, gardening/ mowing the yard, in home exercises Visits from Start of Care: 9    Missed Visits: 0 Established Goals:         Excellent           Good         Limited           None 
[] Increased ROM   []  []  []  [] 
[x] Increased Strength  []  [x]  []  [] 
[x] Increased Mobility  []  [x]  []  []  
[x] Decreased Pain   []  []  [x]  [] 
[] Decreased Swelling  []  []  []  [] 
 
Key Functional Changes:  
Functional Gains: ROM, flexibility in the legs, strength in the knees, improvement in stiffness/swelling in the right knee Functional Deficits: pain, stiffness/swelling in the left knee, uses UEs for sit to stands for stability and strength Pain                   Best: 2/10 Worst: 4/10 Current goals: 
Short Term Goals: To be met in 5 visits 1. Patient will be independent with HEP in order to maintain gains made with physical therapy. 
                      Eval:  Issued 
                      Current: MET, reports compliance 2018 2.  Patient will have decreased pain to 2-3 /10 at worse to increase tolerance to ADLs and job demands.  
                      Eval: 5/10 
                      Current: not met, 2/10 at best, 4/10 at worst 2018                                                                                                      
3. Patient will be able to SLS for 30 sec bilaterally in order to assist with stair negotiation within home.  
                      Eval: 
                      Current: not met, left 25 seconds, right 20 seconds 9/28/2018 Long Term Goals: To be accomplished in 10 treatments: 1. Patient will report 0-1/10 pain to aide with increase in activity tolerance and performance within therapy.  
                      Eval: 
                      Current: not met, 2/10 at best, 4/10 at worst 9/28/2018 2. Patient will increase bilateral knee strength to 5/5 to enable participation in daily functional activities.                       Eval: 
                      Current: not met, right knee flex 4+/5, left knee flex 4+/5 with minimal pain in the knee, right knee EXT 4+/5, left knee EXT 4/5 with pain in the knee 9/28/2018 3. Patient will report at least 50% improvement to allow ease with ADLs and household chores.  
                      Eval: 
                      Current: not met, 30% improvement 9/25/18 4. Patient will be able to ascend/ descend 4 steps pain free with one handrail in order to ensure safety with entering/ exiting the home.  
                      Eval: 
                      Current: not met, 1 handrail ascending 4 six inch steps, 2 handrails with descending 4 six inch steps, secondary to pain and stability 9/28/2018 5. Patient will increase FOTO score to 59 in order to show increase tolerance to community activities.                       Eval: 44 
                      Current: not met, 37 points 9/28/2018 Updated Goals: to be achieved in 4 weeks: 1. Pt will report a decrease in at worst pain to 2/10 in the B knees to improve tolerance to ambulation. PN: 4/10 at worst 
2. Pt will report at least 50% improvement in overall symptoms to improve performance of ADLs. PN: 30% improvement 3.  Pt will perform 3 sit to stands from a chair with 1 or no UE support to improve ease of mobility. PN: reports she has to use her B UEs to help with sit to stands. 4. Pt will improve FOTO score to 59 points to improve mobility in the home and community. PN: 37 points ASSESSMENT/RECOMMENDATIONS: 
Pt reports/demonstrates improvements in ROM, flexibility, and strength since starting therapy. Pt reports having pain, swelling/stiffness in left>right knee. Pt states that she would like to be able to perform a sit to stand without using her UEs for assist. We will plan on continuing therapy at this time to improve the pt's strength and mobility. [x]Continue therapy per initial plan/protocol at a frequency of  2 x per week for 4 weeks []Continue therapy with the following recommended changes:_____________________      _____________________________________________________________________ []Discontinue therapy progressing towards or have reached established goals []Discontinue therapy due to lack of appreciable progress towards goals []Discontinue therapy due to lack of attendance or compliance []Await Physician's recommendations/decisions regarding therapy []Other:________________________________________________________________ Thank you for this referral.  
Shana Dominguez, PT 9/28/2018 3:56 PM 
NOTE TO PHYSICIAN:  PLEASE COMPLETE THE ORDERS BELOW AND  
FAX TO [facility]: [phone] If you are unable to process this request in 24 hours please contact our office: [phone] []  I have read the above report and request that my patient continue as recommended. []  I have read the above report and request that my patient continue therapy with the following changes/special instructions:________________________________________ []I have read the above report and request that my patient be discharged from therapy.  
 
[de-identified] Signature:____________Date:_________TIME:________ 
 
[facility], Date and Time must be completed for valid certification **

## 2018-09-28 NOTE — PROGRESS NOTES
PT DAILY TREATMENT NOTE - [facility]  Patient Name: Conchis Shelton Date:2018 : 1955 [x]  Patient  Verified Payor: BLUE CROSS / Plan: [place] / Product Type: PPO / In time: 3:02   Out time: 3:58 Total Treatment Time (min): 56 Total Timed Codes (min): 46 
1:1 Treatment Time ( only): 38 Visit #: 9 of 10 Treatment Area: Pain in right knee [M25.561] Pain in left knee [M25.562] SUBJECTIVE Pain Level (0-10 scale): 3 left, 2 right Any medication changes, allergies to medications, adverse drug reactions, diagnosis change, or new procedure performed?: [x] No    [] Yes (see summary sheet for update) Subjective functional status/changes:   [] No changes reported Pt reports having trouble with sit to stands and needs UE assist.  
 
OBJECTIVE Modality rationale: decrease pain and increase tissue extensibility to improve the patients ability to aid with increase tolerance to ADLs Min Type Additional Details  
 [] Estim:  []Unatt       []IFC  []Premod []Other:  []w/ice   []w/heat Position: Location:  
 [] Estim: []Att    []TENS instruct  []NMES []Other:  []w/US   []w/ice   []w/heat Position: Location:  
 []  Traction: [] Cervical       []Lumbar 
                     [] Prone          []Supine []Intermittent   []Continuous Lbs: 
[] before manual 
[] after manual  
 []  Ultrasound: []Continuous   [] Pulsed []1MHz   []3MHz W/cm2: 
Location:  
 []  Iontophoresis with dexamethasone Location: [] Take home patch  
[] In clinic  
10 []  Ice     [x]  Heat   
[]  Ice massage 
[]  Laser  
[]  Anodyne Position:reclined Location: B knees  
 []  Laser with stim 
[]  Other:  Position: Location:  
 []  Vasopneumatic Device Pressure:       [] lo [] med [] hi  
Temperature: [] lo [] med [] hi  
[] Skin assessment post-treatment:  []intact []redness- no adverse reaction []redness  adverse reaction:  
 
46 min Therapeutic Exercise:  [x] See flow sheet : exercises, goal reassessment Rationale: increase ROM, increase strength, improve coordination, improve balance and increase proprioception to improve the patients ability to aid with increase tolerance to ADLS and activities With 
 [] TE 
 [] TA 
 [] neuro 
 [] other: Patient Education: [x] Review HEP [] Progressed/Changed HEP based on:  
[] positioning   [] body mechanics   [] transfers   [] heat/ice application   
[] other:   
 
Other Objective/Functional Measures: See goals below. Functional Gains: ROM, flexibility in the legs, strength in the knees, improvement in stiffness/swelling in the right knee Functional Deficits: pain, stiffness/swelling in the left knee, uses UEs for sit to stands for stability and strength Pain        Best: 2/10 Worst: 4/10 Pain Level (0-10 scale) post treatment: left 2/10, right 1/10 ASSESSMENT/Changes in Function: See PN. Pt reports/demonstrates improvements in ROM, flexibility, and strength since starting therapy. Pt reports having pain, swelling/stiffness in left>right knee. Pt states that she would like to be able to perform a sit to stand without using her UEs for assist. We will plan on continuing therapy at this time to improve the pt's strength and mobility. Patient will continue to benefit from skilled PT services to modify and progress therapeutic interventions, address functional mobility deficits, address ROM deficits, address strength deficits, analyze and address soft tissue restrictions, analyze and cue movement patterns, analyze and modify body mechanics/ergonomics, assess and modify postural abnormalities and instruct in home and community integration to attain remaining goals. []  See Plan of Care [x]  See progress note/recertification 
[]  See Discharge Summary Progress towards goals / Updated goals: Short Term Goals: To be met in 5 visits 1. Patient will be independent with HEP in order to maintain gains made with physical therapy. 
                      Eval:  Issued 
                      Current: MET, reports compliance 9/28/2018 2. Patient will have decreased pain to 2-3 /10 at worse to increase tolerance to ADLs and job demands.  
                      Eval: 5/10 
                      Current: not met, 2/10 at best, 4/10 at worst 9/28/2018                                                                                                     
3. Patient will be able to SLS for 30 sec bilaterally in order to assist with stair negotiation within home.  
                      Eval: 
                      Current: not met, left 25 seconds, right 20 seconds 9/28/2018 Long Term Goals: To be accomplished in 10 treatments: 1. Patient will report 0-1/10 pain to aide with increase in activity tolerance and performance within therapy.  
                      Eval: 
                      Current: not met, 2/10 at best, 4/10 at worst 9/28/2018 2. Patient will increase bilateral knee strength to 5/5 to enable participation in daily functional activities.                       Eval: 
                      Current: not met, right knee flex 4+/5, left knee flex 4+/5 with minimal pain in the knee, right knee EXT 4+/5, left knee EXT 4/5 with pain in the knee 9/28/2018 3. Patient will report at least 50% improvement to allow ease with ADLs and household chores.  
                      Eval: 
                      Current: not met, 30% improvement 9/25/18 4. Patient will be able to ascend/ descend 4 steps pain free with one handrail in order to ensure safety with entering/ exiting the home.  
                      Eval: 
                      Current: not met, 1 handrail ascending 4 six inch steps, 2 handrails with descending 4 six inch steps, secondary to pain and stability 9/28/2018 5. Patient will increase FOTO score to 59 in order to show increase tolerance to community activities.                       Eval: 44 
                      Current: not met, 37 points 9/28/2018   
  
PLAN [x]  Upgrade activities as tolerated     [x]  Continue plan of care [x]  Update interventions per flow sheet      
[]  Discharge due to:_ 
[]  Other:_ Mike Sarmiento PT 9/28/2018  3:33 PM 
 
Future Appointments Date Time Provider Mag Cobian 9/28/2018 3:00 PM [facility]

## 2018-10-02 ENCOUNTER — HOSPITAL ENCOUNTER (OUTPATIENT)
Dept: PHYSICAL THERAPY | Age: 63
Discharge: HOME | End: 2018-10-02
Payer: COMMERCIAL

## 2018-10-02 PROCEDURE — 97110 THERAPEUTIC EXERCISES: CPT

## 2018-10-02 NOTE — PROGRESS NOTES
PT DAILY TREATMENT NOTE 10-18 Patient Name: Kate Yanes Date:10/2/2018 : 1955 [x]  Patient  Verified Payor: BLUE CROSS / [place]: [place] / Product Type: PPO / In time: 3:24  Out time:4:16 Total Treatment Time (min): 52 Visit #: 1 of 8 (signed PN) Medicare/BCBS Only Total Timed Codes (min):  42 1:1 Treatment Time:  35 Treatment Area: Pain in right knee [M25.561] Pain in left knee [M25.562] SUBJECTIVE Pain Level (0-10 scale): 3 Any medication changes, allergies to medications, adverse drug reactions, diagnosis change, or new procedure performed?: [x] No    [] Yes (see summary sheet for update) Subjective functional status/changes:   [] No changes reported Pt states she she sometimes has trouble with performing a sit to stand with UE support. Pt reports that yesterday she felt like she needed more effort to do certain activities. Pt reports she went to yoga yesterday. OBJECTIVE Modality rationale: decrease pain and increase tissue extensibility to improve the patients ability to tolerate ADLs Min Type Additional Details  
 [] Estim:  []Unatt       []IFC  []Premod []Other:  []w/ice   []w/heat Position: Location:  
 [] Estim: []Att    []TENS instruct  []NMES []Other:  []w/US   []w/ice   []w/heat Position: Location:  
 []  Traction: [] Cervical       []Lumbar 
                     [] Prone          []Supine []Intermittent   []Continuous Lbs: 
[] before manual 
[] after manual  
 []  Ultrasound: []Continuous   [] Pulsed []1MHz   []3MHz W/cm2: 
Location:  
 []  Iontophoresis with dexamethasone Location: [] Take home patch  
[] In clinic  
10 []  Ice     [x]  heat 
[]  Ice massage 
[]  Laser  
[]  Anodyne Position:reclined Location: B knees  
 []  Laser with stim 
[]  Other:  Position: Location: []  Vasopneumatic Device Pressure:       [] lo [] med [] hi  
Temperature: [] lo [] med [] hi  
[] Skin assessment post-treatment:  []intact []redness- no adverse reaction 
  []redness  adverse reaction:  
 
42 min Therapeutic Exercise:  [x] See flow sheet :  
Rationale: increase ROM and increase strength to improve the patients ability to tolerate ADLs With 
 [] TE 
 [] TA 
 [] neuro 
 [] other: Patient Education: [x] Review HEP [] Progressed/Changed HEP based on:  
[] positioning   [] body mechanics   [] transfers   [] heat/ice application   
[] other:   
 
Other Objective/Functional Measures: Added 1# weights to hipx3, HS curls, LAQ, B hip IR/ER to improve strength and mobility in the LEs. Needed UE support at times to maintain balance with SLS on the left LE. Pain Level (0-10 scale) post treatment: 2 
 
ASSESSMENT/Changes in Function: Decreased pain reported post session. Educated pt that she may experience soreness later today and tomorrow secondary to progressing exercises per flow sheet. Pt able to perform 10 sit to stands from elevated plinth today with no UE support. Increased B UE support noted with step ups with the left LE ascending. Continue POC as tolerated. Patient will continue to benefit from skilled PT services to modify and progress therapeutic interventions, address functional mobility deficits, address ROM deficits, address strength deficits, analyze and address soft tissue restrictions, analyze and cue movement patterns, analyze and modify body mechanics/ergonomics, address imbalance/dizziness and instruct in home and community integration to attain remaining goals. []  See Plan of Care 
[]  See progress note/recertification 
[]  See Discharge Summary Progress towards goals / Updated goals: 
Updated Goals: to be achieved in 4 weeks: 1. Pt will report a decrease in at worst pain to 2/10 in the B knees to improve tolerance to ambulation.   
PN: 4/10 at worst 
 2. Pt will report at least 50% improvement in overall symptoms to improve performance of ADLs. PN: 30% improvement 3. Pt will perform 3 sit to stands from a chair with 1 or no UE support to improve ease of mobility. PN: reports she has to use her B UEs to help with sit to stands. 4. Pt will improve FOTO score to 59 points to improve mobility in the home and community. PN: 37 points PLAN [x]  Upgrade activities as tolerated     [x]  Continue plan of care [x]  Update interventions per flow sheet      
[]  Discharge due to:_ 
[]  Other:_ Orvis Neighbor, PT 10/2/2018  3:35 PM 
 
Future Appointments Date Time Provider Mag Cobian 10/5/2018 11:30 AM Dewayne Rich, PT [facility]  
10/9/2018 10:30 AM Priya Garcia PT [facility]  
10/12/2018 10:30 AM Dewayne Rich PT [facility]

## 2018-10-05 ENCOUNTER — HOSPITAL ENCOUNTER (OUTPATIENT)
Dept: PHYSICAL THERAPY | Age: 63
Discharge: HOME | End: 2018-10-05
Payer: COMMERCIAL

## 2018-10-05 PROCEDURE — 97110 THERAPEUTIC EXERCISES: CPT

## 2018-10-05 NOTE — PROGRESS NOTES
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 / Product Type: PPO / In time:11:34  Out time:12:26 Total Treatment Time (min): 52 Visit #: 2 of 10 Medicare/BCBS Only Total Timed Codes (min):  42 1:1 Treatment Time:  30 Treatment Area: Pain in right knee [M25.561] Pain in left knee [M25.562] SUBJECTIVE Pain Level (0-10 scale): 2-3 Any medication changes, allergies to medications, adverse drug reactions, diagnosis change, or new procedure performed?: [x] No    [] Yes (see summary sheet for update) Subjective functional status/changes:   [] No changes reported States she was very sore after last therapy session but that she expected it and was glad she was able to work hard. She has been keeping up with her HEP and riding her bike at home. OBJECTIVE Modality rationale: decrease pain and increase tissue extensibility to improve the patients ability to ease with tolerance to ADLs Min Type Additional Details  
 [] Estim:  []Unatt       []IFC  []Premod []Other:  []w/ice   []w/heat Position: Location:  
 [] Estim: []Att    []TENS instruct  []NMES []Other:  []w/US   []w/ice   []w/heat Position: Location:  
 []  Traction: [] Cervical       []Lumbar 
                     [] Prone          []Supine []Intermittent   []Continuous Lbs: 
[] before manual 
[] after manual  
 []  Ultrasound: []Continuous   [] Pulsed []1MHz   []3MHz W/cm2: 
Location:  
 []  Iontophoresis with dexamethasone Location: [] Take home patch  
[] In clinic  
10 []  Ice     [x]  heat 
[]  Ice massage 
[]  Laser  
[]  Anodyne Position: sitting Location: bilateral knees  
 []  Laser with stim 
[]  Other:  Position: Location:  
 []  Vasopneumatic Device Pressure:       [] lo [] med [] hi  
 Temperature: [] lo [] med [] hi  
[] Skin assessment post-treatment:  []intact []redness- no adverse reaction 
  []redness  adverse reaction:  
 
 
 
42 min Therapeutic Exercise:  [x] See flow sheet :  
Rationale: increase strength, improve balance and increase proprioception to improve the patients ability to perform daily household chores. With 
 [] TE 
 [] TA 
 [] neuro 
 [] other: Patient Education: [x] Review HEP [] Progressed/Changed HEP based on:  
[] positioning   [] body mechanics   [] transfers   [] heat/ice application   
[] other:   
 
Other Objective/Functional Measures: Tolerated all exercises well Reports of fatigue with step ups and sit<> stand Pain Level (0-10 scale) post treatment: 2 
 
ASSESSMENT/Changes in Function: Patient continues to show improvements with signs/ symptoms however still demonstrates a decrease in strength, deficits with balance and an increase in pain with functional activities. Patient will continue to benefit from skilled PT services to modify and progress therapeutic interventions, address functional mobility deficits, address strength deficits, analyze and cue movement patterns, analyze and modify body mechanics/ergonomics and address imbalance/dizziness to attain remaining goals. [x]  See Plan of Care 
[]  See progress note/recertification 
[]  See Discharge Summary Progress towards goals / Updated goals: 
Updated Goals: to be achieved in 4 weeks: 1. Pt will report a decrease in at worst pain to 2/10 in the B knees to improve tolerance to ambulation. PN: 4/10 at worst 
2. Pt will report at least 50% improvement in overall symptoms to improve performance of ADLs. PN: 30% improvement 3. Pt will perform 3 sit to stands from a chair with 1 or no UE support to improve ease of mobility. PN: reports she has to use her B UEs to help with sit to stands. CURRENT: Able to perform 10x no UE on elevated mat table (10/5/18) 4. Pt will improve FOTO score to 59 points to improve mobility in the home and community. PN: 37 points PLAN [x]  Upgrade activities as tolerated     [x]  Continue plan of care 
[]  Update interventions per flow sheet      
[]  Discharge due to:_ 
[]  Other:_ Rosalie Woods, PT 10/5/2018  7:52 AM 
 
Future Appointments Date Time Provider Mag Cobian 10/5/2018 11:30 AM Rosalie Woods PT [facility]  
10/9/2018 10:30 AM Aida Coppola PT [facility]  
10/12/2018 10:30 AM Rosalie Woods, PT [facility]

## 2018-10-09 ENCOUNTER — HOSPITAL ENCOUNTER (OUTPATIENT)
Dept: PHYSICAL THERAPY | Age: 63
Discharge: HOME | End: 2018-10-09
Payer: COMMERCIAL

## 2018-10-09 PROCEDURE — 97110 THERAPEUTIC EXERCISES: CPT

## 2018-10-09 NOTE — PROGRESS NOTES
PT DAILY TREATMENT NOTE 10-18 Patient Name: Simeon Harris Date:10/9/2018 : 1955 [x]  Patient  Verified Payor: BLUE CROSS / Plan: [place] [employer] / Product Type: PPO / In time:1035  Out time:1134 Total Treatment Time (min): 45 Visit #: 3 of 8  Count corrected. Medicare/BCBS Only Total Timed Codes (min):  35 1:1 Treatment Time:  15  
 
 
Treatment Area: Pain in right knee [M25.561] Pain in left knee [M25.562] SUBJECTIVE Pain Level (0-10 scale): 2 Any medication changes, allergies to medications, adverse drug reactions, diagnosis change, or new procedure performed?: [x] No    [] Yes (see summary sheet for update) Subjective functional status/changes:   [] No changes reported They are doing some better. OBJECTIVE Modality rationale: decrease pain and increase tissue extensibility to improve the patients ability to aid with increase tolerance to ADLs and activities Min Type Additional Details  
 [] Estim:  []Unatt       []IFC  []Premod []Other:  []w/ice   []w/heat Position: Location:  
 [] Estim: []Att    []TENS instruct  []NMES []Other:  []w/US   []w/ice   []w/heat Position: Location:  
 []  Traction: [] Cervical       []Lumbar 
                     [] Prone          []Supine []Intermittent   []Continuous Lbs: 
[] before manual 
[] after manual  
 []  Ultrasound: []Continuous   [] Pulsed []1MHz   []3MHz W/cm2: 
Location:  
 []  Iontophoresis with dexamethasone Location: [] Take home patch  
[] In clinic  
10 []  Ice     [x]  Heat  post 
[]  Ice massage 
[]  Laser  
[]  Anodyne Position:reclined Location:B knees  
 []  Laser with stim 
[]  Other:  Position: Location:  
 []  Vasopneumatic Device Pressure:       [] lo [] med [] hi  
Temperature: [] lo [] med [] hi  
[] Skin assessment post-treatment:  []intact []redness- no adverse reaction []redness  adverse reaction:  
 
 min []Eval                  []Re-Eval  
 
 
35 min Therapeutic Exercise:  [x] See flow sheet :  
Rationale: increase ROM, increase strength, improve coordination, improve balance and increase proprioception to improve the patients ability to aid with increase tolerance to ADLs and activities 
 
 min Therapeutic Activity:  []  See flow sheet :  
Rationale:   to improve the patients ability to  
  
 min Neuromuscular Re-education:  []  See flow sheet :  
Rationale:   to improve the patients ability to  
 
 min Manual Therapy:    
Rationale:  to  
 
 min Gait Training:  ___ feet with ___ device on level surfaces with ___ level of assist  
Rationale: With 
 [] TE 
 [] TA 
 [] neuro 
 [] other: Patient Education: [x] Review HEP [] Progressed/Changed HEP based on:  
[] positioning   [] body mechanics   [] transfers   [] heat/ice application   
[] other:   
 
Other Objective/Functional Measures: VC exercises and technique Pain Level (0-10 scale) post treatment: 2 
 
ASSESSMENT/Changes in Function: tolerated well. Patient will continue to benefit from skilled PT services to modify and progress therapeutic interventions, address functional mobility deficits, address ROM deficits, address strength deficits, analyze and address soft tissue restrictions, analyze and cue movement patterns, analyze and modify body mechanics/ergonomics, assess and modify postural abnormalities and instruct in home and community integration to attain remaining goals. [x]  See Plan of Care 
[]  See progress note/recertification 
[]  See Discharge Summary Progress towards goals / Updated goals: 
 Updated Goals: to be achieved in 4 weeks: 1. Pt will report a decrease in at worst pain to 2/10 in the B knees to improve tolerance to ambulation. PN: 4/10 at worst 
CURRENT 2 10/9/18 2. Pt will report at least 50% improvement in overall symptoms to improve performance of ADLs. PN: 30% improvement CURRENT NA 10/9/18 3. Pt will perform 3 sit to stands from a chair with 1 or no UE support to improve ease of mobility. PN: reports she has to use her B UEs to help with sit to stands. CURRENT: Able to perform 10x no UE on elevated mat table (10/5/18)  10/9/18 4. Pt will improve FOTO score to 59 points to improve mobility in the home and community. PN: 37 points CURRENT 
  
 
PLAN [x]  Upgrade activities as tolerated     [x]  Continue plan of care 
[]  Update interventions per flow sheet      
[]  Discharge due to:_ 
[]  Other:_ Geronimo Shearer PT 10/9/2018  10:51 AM 
 
Future Appointments Date Time Provider Mag Cobian 10/12/2018 10:30 AM Azeb Greenwood PT [facility]

## 2018-10-12 ENCOUNTER — HOSPITAL ENCOUNTER (OUTPATIENT)
Dept: PHYSICAL THERAPY | Age: 63
Discharge: HOME | End: 2018-10-12
Payer: COMMERCIAL

## 2018-10-12 PROCEDURE — 97110 THERAPEUTIC EXERCISES: CPT

## 2018-10-12 NOTE — PROGRESS NOTES
PT DAILY TREATMENT NOTE 10-18 Patient Name: Norman Frazier Date:10/12/2018 : 1955 [x]  Patient  Verified Payor: BLUE CROSS / Plan: [place] [employer] / Product Type: PPO / In time:10:33  Out time:11:19 Total Treatment Time (min): 47 Visit #: 4 of 8 Medicare/BCBS Only Total Timed Codes (min):  37 1:1 Treatment Time:  37 Treatment Area: Pain in right knee [M25.561] Pain in left knee [M25.562] SUBJECTIVE Pain Level (0-10 scale): 2 Any medication changes, allergies to medications, adverse drug reactions, diagnosis change, or new procedure performed?: [x] No    [] Yes (see summary sheet for update) Subjective functional status/changes:   [] No changes reported Patient rode her bike for 40 minutes yesterday and stated that she was in more pain last night due to the weather changes OBJECTIVE Modality rationale: decrease pain and increase tissue extensibility to improve the patients ability to ease with tolerance to ADLs Min Type Additional Details  
 [] Estim:  []Unatt       []IFC  []Premod []Other:  []w/ice   []w/heat Position: Location:  
 [] Estim: []Att    []TENS instruct  []NMES []Other:  []w/US   []w/ice   []w/heat Position: Location:  
 []  Traction: [] Cervical       []Lumbar 
                     [] Prone          []Supine []Intermittent   []Continuous Lbs: 
[] before manual 
[] after manual  
 []  Ultrasound: []Continuous   [] Pulsed []1MHz   []3MHz W/cm2: 
Location:  
 []  Iontophoresis with dexamethasone Location: [] Take home patch  
[] In clinic  
10 []  Ice     [x]  heat 
[]  Ice massage 
[]  Laser  
[]  Anodyne Position: sitting Location: bilateral knees  
 []  Laser with stim 
[]  Other:  Position: Location:  
 []  Vasopneumatic Device Pressure:       [] lo [] med [] hi  
Temperature: [] lo [] med [] hi  
 [] Skin assessment post-treatment:  []intact []redness- no adverse reaction 
  []redness  adverse reaction:  
 
 
 
37 min Therapeutic Exercise:  [x] See flow sheet :  
Rationale: increase ROM, increase strength, improve balance and increase proprioception to improve the patients ability to perform daily household chores. With 
 [] TE 
 [] TA 
 [] neuro 
 [] other: Patient Education: [x] Review HEP [] Progressed/Changed HEP based on:  
[] positioning   [] body mechanics   [] transfers   [] heat/ice application   
[] other:   
 
Other Objective/Functional Measures: Tolerated exercises well - require verbal cueing for proper form during squats Pain Level (0-10 scale) post treatment: 0 
 
ASSESSMENT/Changes in Function: Patient continues to show improvements with signs/ symptoms however still demonstrates a decrease in strength, deficits with balance and an increase in pain with functional activities. Patient will continue to benefit from skilled PT services to modify and progress therapeutic interventions, address functional mobility deficits, address strength deficits, analyze and cue movement patterns and analyze and modify body mechanics/ergonomics to attain remaining goals. [x]  See Plan of Care 
[]  See progress note/recertification 
[]  See Discharge Summary Updated Goals: to be achieved in 4 weeks: 1. Pt will report a decrease in at worst pain to 2/10 in the B knees to improve tolerance to ambulation. PN: 4/10 at worst 
CURRENT 2 10/9/18 2. Pt will report at least 50% improvement in overall symptoms to improve performance of ADLs. PN: 30% improvement CURRENT NA 10/9/18 3. Pt will perform 3 sit to stands from a chair with 1 or no UE support to improve ease of mobility. PN: reports she has to use her B UEs to help with sit to stands.   
CURRENT: Able to perform 10x no UE on elevated mat table (10/5/18) 10/9/18, MET 10 x on low mat table with no increases in pain (10-12-18) 4. Pt will improve FOTO score to 59 points to improve mobility in the home and community. PN: 37 points CURRENT 
 
PLAN [x]  Upgrade activities as tolerated     [x]  Continue plan of care 
[]  Update interventions per flow sheet      
[]  Discharge due to:_ 
[]  Other:_ Vince Johnston PT 10/12/2018  10:07 AM 
 
Future Appointments Date Time Provider Mag Cobian 10/12/2018 10:30 AM Vince Johnston PT [facility]

## 2018-10-16 ENCOUNTER — HOSPITAL ENCOUNTER (OUTPATIENT)
Dept: PHYSICAL THERAPY | Age: 63
Discharge: HOME | End: 2018-10-16
Payer: COMMERCIAL

## 2018-10-16 PROCEDURE — 97110 THERAPEUTIC EXERCISES: CPT

## 2018-10-16 NOTE — PROGRESS NOTES
PT DAILY TREATMENT NOTE 10-18 Patient Name: Cynthia Flaherty Date:10/16/2018 : 1955 [x]  Patient  Verified Payor: BLUE CROSS / Plan: [place] [employer] / Product Type: PPO / In time:1008  Out time:1058 Total Treatment Time (min): 48 Visit #: 5 of 8 Medicare/BCBS Only Total Timed Codes (min):  38 1:1 Treatment Time:  30 Treatment Area: Pain in right knee [M25.561] Pain in left knee [M25.562] SUBJECTIVE Pain Level (0-10 scale): 2 Any medication changes, allergies to medications, adverse drug reactions, diagnosis change, or new procedure performed?: [x] No    [] Yes (see summary sheet for update) Subjective functional status/changes:   [] No changes reported I have really been doing a lot outside in the yard since Friday and I didn't know if I was going to be able to make it in today but I did. I did the yoga class yesterday and did well with that. I was able to get out of the chair without holding on. OBJECTIVE Modality rationale: decrease pain and increase tissue extensibility to improve the patients ability to aid with increase tolerance to ADLs and activities Min Type Additional Details  
 [] Estim:  []Unatt       []IFC  []Premod []Other:  []w/ice   []w/heat Position: Location:  
 [] Estim: []Att    []TENS instruct  []NMES []Other:  []w/US   []w/ice   []w/heat Position: Location:  
 []  Traction: [] Cervical       []Lumbar 
                     [] Prone          []Supine []Intermittent   []Continuous Lbs: 
[] before manual 
[] after manual  
 []  Ultrasound: []Continuous   [] Pulsed []1MHz   []3MHz W/cm2: 
Location:  
 []  Iontophoresis with dexamethasone Location: [] Take home patch  
[] In clinic  
10 []  Ice     [x]  Heat post  
[]  Ice massage 
[]  Laser  
[]  Anodyne Position: reclined Location: B knees   
 []  Laser with stim 
[]  Other:  Position: Location:  
 []  Vasopneumatic Device Pressure:       [] lo [] med [] hi  
Temperature: [] lo [] med [] hi  
[] Skin assessment post-treatment:  []intact []redness- no adverse reaction 
  []redness  adverse reaction:  
 
  min []Eval                  []Re-Eval  
 
 
38 min Therapeutic Exercise:  [x] See flow sheet :  
Rationale: increase ROM, increase strength, improve coordination, improve balance and increase proprioception to improve the patients ability to aid with increase tolerance to ADLs and activities 
 
 min Therapeutic Activity:  []  See flow sheet :  
Rationale:   to improve the patients ability to  
  
 min Neuromuscular Re-education:  []  See flow sheet :  
Rationale:   to improve the patients ability to  
 
 min Manual Therapy:    
Rationale:  to  
 
 min Gait Training:  ___ feet with ___ device on level surfaces with ___ level of assist  
Rationale: With 
 [] TE 
 [] TA 
 [] neuro 
 [] other: Patient Education: [x] Review HEP [] Progressed/Changed HEP based on:  
[] positioning   [] body mechanics   [] transfers   [] heat/ice application   
[] other:   
 
Other Objective/Functional Measures: VC exercises and technique    FOTO 50 Pain Level (0-10 scale) post treatment: <2 
 
ASSESSMENT/Changes in Function: tolerated well. Patient will continue to benefit from skilled PT services to modify and progress therapeutic interventions, address functional mobility deficits, address ROM deficits, address strength deficits, analyze and address soft tissue restrictions, analyze and cue movement patterns, analyze and modify body mechanics/ergonomics, assess and modify postural abnormalities, address imbalance/dizziness and instruct in home and community integration to attain remaining goals. [x]  See Plan of Care [x]  See progress note/recertification 
[]  See Discharge Summary Progress towards goals / Updated goals: 
Updated Goals: to be achieved in 4 weeks: 1. Pt will report a decrease in at worst pain to 2/10 in the B knees to improve tolerance to ambulation. PN: 4/10 at worst 
CURRENT 2 10/9/18   2 10/16/18 2. Pt will report at least 50% improvement in overall symptoms to improve performance of ADLs. PN: 30% improvement CURRENT NA 10/9/18 3. Pt will perform 3 sit to stands from a chair with 1 or no UE support to improve ease of mobility. PN: reports she has to use her B UEs to help with sit to stands. CURRENT: Able to perform 10x no UE on elevated mat table (10/5/18)  10/9/18, MET 10 x on low mat table with no increases in pain (10-12-18)  Met 10/16/18 4. Pt will improve FOTO score to 59 points to improve mobility in the home and community. PN: 37 points CURRENT   50  10/16/18 
  
 
PLAN [x]  Upgrade activities as tolerated     []  Continue plan of care 
[]  Update interventions per flow sheet      
[]  Discharge due to:_ 
[]  Other:_ Ruthie Cleaning PT 10/16/2018  10:05 AM 
 
Future Appointments Date Time Provider Mag Cobian 10/17/2018 11:45 AM [facility]  
10/19/2018 12:00 PM Ruthie Cleaning PT [facility]  
10/23/2018 11:30 AM Goldie Smallwood PT [facility]  
10/26/2018 11:00 AM Tri Garcia, PT [facility]

## 2018-10-17 ENCOUNTER — HOSPITAL ENCOUNTER (OUTPATIENT)
Dept: MAMMOGRAPHY | Age: 63
Discharge: HOME | End: 2018-10-17
Attending: FAMILY MEDICINE
Payer: COMMERCIAL

## 2018-10-17 DIAGNOSIS — Z12.31 VISIT FOR SCREENING MAMMOGRAM: ICD-10-CM

## 2018-10-17 PROCEDURE — 77063 BREAST TOMOSYNTHESIS BI: CPT

## 2018-10-17 PROCEDURE — 77067 SCR MAMMO BI INCL CAD: CPT

## 2018-10-19 ENCOUNTER — HOSPITAL ENCOUNTER (OUTPATIENT)
Dept: PHYSICAL THERAPY | Age: 63
Discharge: HOME | End: 2018-10-19
Payer: COMMERCIAL

## 2018-10-19 PROCEDURE — 97110 THERAPEUTIC EXERCISES: CPT

## 2018-10-19 NOTE — PROGRESS NOTES
PT DAILY TREATMENT NOTE 10-18 Patient Name: Van Mantilla Date:10/19/2018 : 1955 [x]  Patient  Verified Payor: BLUE CROSS / Plan: [place] BLUE [place]OSS [employer] / Product Type: PPO / In time:1207  Out time:1257 Total Treatment Time (min): 49 Visit #: 6 of 8 Medicare/BCBS Only Total Timed Codes (min):  39 1:1 Treatment Time:  44 Treatment Area: Pain in right knee [M25.561] Pain in left knee [M25.562] SUBJECTIVE Pain Level (0-10 scale): 2 Any medication changes, allergies to medications, adverse drug reactions, diagnosis change, or new procedure performed?: [x] No    [] Yes (see summary sheet for update) Subjective functional status/changes:   [] No changes reported The therapy has helped. OBJECTIVE Modality rationale: decrease pain and increase tissue extensibility to improve the patients ability to aid with increase tolerance to ADLS and activities Min Type Additional Details  
 [] Estim:  []Unatt       []IFC  []Premod []Other:  []w/ice   []w/heat Position: Location:  
 [] Estim: []Att    []TENS instruct  []NMES []Other:  []w/US   []w/ice   []w/heat Position: Location:  
 []  Traction: [] Cervical       []Lumbar 
                     [] Prone          []Supine []Intermittent   []Continuous Lbs: 
[] before manual 
[] after manual  
 []  Ultrasound: []Continuous   [] Pulsed []1MHz   []3MHz W/cm2: 
Location:  
 []  Iontophoresis with dexamethasone Location: [] Take home patch  
[] In clinic  
10 []  Ice     [x]  Heat post []  Ice massage 
[]  Laser  
[]  Anodyne Position:reclined Location:B knees  
 []  Laser with stim 
[]  Other:  Position: Location:  
 []  Vasopneumatic Device Pressure:       [] lo [] med [] hi  
Temperature: [] lo [] med [] hi  
[] Skin assessment post-treatment:  []intact []redness- no adverse reaction 
  []redness  adverse reaction: min []Eval                  []Re-Eval  
 
 
39 min Therapeutic Exercise:  [x] See flow sheet :  
Rationale: increase ROM, increase strength, improve coordination, improve balance and increase proprioception to improve the patients ability to aid with increase tolerance to ADLs and activities 
 
 min Therapeutic Activity:  []  See flow sheet :  
Rationale:   to improve the patients ability to  
  
 min Neuromuscular Re-education:  []  See flow sheet :  
Rationale:   to improve the patients ability to  
 
 min Manual Therapy:    
Rationale:  to  
 
 min Gait Training:  ___ feet with ___ device on level surfaces with ___ level of assist  
Rationale: With 
 [] TE 
 [] TA 
 [] neuro 
 [] other: Patient Education: [x] Review HEP [] Progressed/Changed HEP based on:  
[] positioning   [] body mechanics   [] transfers   [] heat/ice application   
[] other:   
 
Other Objective/Functional Measures: VC exercises and technique Pain Level (0-10 scale) post treatment: <2 
 
ASSESSMENT/Changes in Function: tolerated well. Patient will continue to benefit from skilled PT services to modify and progress therapeutic interventions, address functional mobility deficits, address ROM deficits, address strength deficits, analyze and address soft tissue restrictions, analyze and cue movement patterns, analyze and modify body mechanics/ergonomics, assess and modify postural abnormalities and instruct in home and community integration to attain remaining goals. [x]  See Plan of Care [x]  See progress note/recertification 
[]  See Discharge Summary Progress towards goals / Updated goals: 
Updated Goals: to be achieved in 4 weeks: 1. Pt will report a decrease in at worst pain to 2/10 in the B knees to improve tolerance to ambulation. PN: 4/10 at worst 
CURRENT 2 10/9/18   2 10/16/18    2  10/19/18 2. Pt will report at least 50% improvement in overall symptoms to improve performance of ADLs. PN: 30% improvement CURRENT NA 10/9/18 3. Pt will perform 3 sit to stands from a chair with 1 or no UE support to improve ease of mobility. PN: reports she has to use her B UEs to help with sit to stands. CURRENT: Able to perform 10x no UE on elevated mat table (10/5/18)  10/9/18, MET 10 x on low mat table with no increases in pain (10-12-18)  Met 10/16/18 4. Pt will improve FOTO score to 59 points to improve mobility in the home and community. PN: 37 points CURRENT   50  10/16/18 
  
 
 
 
PLAN [x]  Upgrade activities as tolerated     [x]  Continue plan of care 
[]  Update interventions per flow sheet      
[]  Discharge due to:_ 
[]  Other:_ Freddie Galan, PT 10/19/2018  12:22 PM 
 
Future Appointments Date Time Provider Mag Cobian 10/23/2018 11:30 AM Christel Interiano, PT [facility]  
10/26/2018 11:00 AM Isiah Carlisle PT [facility]

## 2018-10-23 ENCOUNTER — HOSPITAL ENCOUNTER (OUTPATIENT)
Dept: PHYSICAL THERAPY | Age: 63
Discharge: HOME | End: 2018-10-23
Payer: COMMERCIAL

## 2018-10-23 PROCEDURE — 97110 THERAPEUTIC EXERCISES: CPT

## 2018-10-23 NOTE — PROGRESS NOTES
PT DAILY TREATMENT NOTE 10-18 Patient Name: Ryan Plata Date:10/23/2018 : 1955 [x]  Patient  Verified Payor: BLUE CROSS / Plan: [place] BLUE [place]OSS [employer]ERAL / Product Type: PPO / In time: 11:40  Out time: 12:28 Total Treatment Time (min): 48 Visit #: 7 of 8 Medicare/BCBS Only Total Timed Codes (min): 38 1:1 Treatment Time:  45 Treatment Area: Pain in right knee [M25.561] Pain in left knee [M25.562] SUBJECTIVE Pain Level (0-10 scale): 2 Any medication changes, allergies to medications, adverse drug reactions, diagnosis change, or new procedure performed?: [x] No    [] Yes (see summary sheet for update) Subjective functional status/changes:   [] No changes reported Reports that her knees are doing OK today. OBJECTIVE Modality rationale: decrease pain and increase tissue extensibility to improve the patients ability to aid with increase tolerance to ADLS and activities Min Type Additional Details  
 [] Estim:  []Unatt       []IFC  []Premod []Other:  []w/ice   []w/heat Position: Location:  
 [] Estim: []Att    []TENS instruct  []NMES []Other:  []w/US   []w/ice   []w/heat Position: Location:  
 []  Traction: [] Cervical       []Lumbar 
                     [] Prone          []Supine []Intermittent   []Continuous Lbs: 
[] before manual 
[] after manual  
 []  Ultrasound: []Continuous   [] Pulsed []1MHz   []3MHz W/cm2: 
Location:  
 []  Iontophoresis with dexamethasone Location: [] Take home patch  
[] In clinic  
10 []  Ice     [x]  Heat  
[]  Ice massage 
[]  Laser  
[]  Anodyne Position:reclined Location:B knees  
 []  Laser with stim 
[]  Other:  Position: Location:  
 []  Vasopneumatic Device Pressure:       [] lo [] med [] hi  
Temperature: [] lo [] med [] hi  
[] Skin assessment post-treatment:  []intact []redness- no adverse reaction []redness  adverse reaction:  
 
38 min Therapeutic Exercise:  [x] See flow sheet :  
Rationale: increase ROM, increase strength, improve coordination, improve balance and increase proprioception to improve the patients ability to aid with increase tolerance to ADLs With 
 [] TE 
 [] TA 
 [] neuro 
 [] other: Patient Education: [x] Review HEP [] Progressed/Changed HEP based on:  
[] positioning   [] body mechanics   [] transfers   [] heat/ice application   
[] other:   
  
Other Objective/Functional Measures: Added 1.5# weights to hipx3, HS curls, LAQ, and sitting hip IR/ER to improve strength in the LEs. Pain Level (0-10 scale) post treatment: 0 
 
ASSESSMENT/Changes in Function: Mild EXT lag noted with SLR on the left LE. Reported no pain post session and states that therapy has helped with her pain overall. We will plan on d/c to HEP NV. Continue POC as tolerated. Patient will continue to benefit from skilled PT services to modify and progress therapeutic interventions, address functional mobility deficits, address ROM deficits, address strength deficits, analyze and address soft tissue restrictions, analyze and cue movement patterns, analyze and modify body mechanics/ergonomics, assess and modify postural abnormalities and instruct in home and community integration to attain remaining goals. []  See Plan of Care 
[]  See progress note/recertification 
[]  See Discharge Summary Progress towards goals / Updated goals: 
Updated Goals: to be achieved in 4 weeks: 1. Pt will report a decrease in at worst pain to 2/10 in the B knees to improve tolerance to ambulation. PN: 4/10 at worst 
CURRENT 2 10/9/18   2 10/16/18    2  10/19/18 2. Pt will report at least 50% improvement in overall symptoms to improve performance of ADLs. PN: 30% improvement CURRENT NA 10/9/18 3. Pt will perform 3 sit to stands from a chair with 1 or no UE support to improve ease of mobility. PN: reports she has to use her B UEs to help with sit to stands. CURRENT: Able to perform 10x no UE on elevated mat table (10/5/18)  10/9/18, MET 10 x on low mat table with no increases in pain (10-12-18)  Met 10/16/18 4. Pt will improve FOTO score to 59 points to improve mobility in the home and community. PN: 37 points CURRENT   50  10/16/18 
  
PLAN [x]  Upgrade activities as tolerated     [x]  Continue plan of care [x]  Update interventions per flow sheet      
[]  Discharge due to:_ 
[]  Other:_ Liudmila Salazar, PT 10/23/2018  11:43 AM 
 
Future Appointments Date Time Provider Mag Cobian 10/23/2018 11:30 AM Jina Carias, PT [facility] [address]  
10/26/2018 11:00 AM Maddi Walden, PT [facility] [address]

## 2018-10-26 ENCOUNTER — HOSPITAL ENCOUNTER (OUTPATIENT)
Dept: PHYSICAL THERAPY | Age: 63
Discharge: HOME | End: 2018-10-26
Payer: COMMERCIAL

## 2018-10-26 PROCEDURE — 97110 THERAPEUTIC EXERCISES: CPT

## 2018-10-26 NOTE — PROGRESS NOTES
PT DISCHARGE DAILY NOTE AND [id number] Date:10/26/2018 Patient name: Lupe Jackson Start of Care: 2018 Referral source: Andres Corbett DO : 1955                         
Medical Diagnosis: Pain in right knee [M25.561] Pain in left knee [M25.562] Onset Date:2018                         
Treatment Diagnosis: bilateral knee osteoarthritis Prior Hospitalization: see medical history Provider#: [id number] Medications: Verified on Patient summary List  
 Comorbidities: HBP- controlled 
 Prior Level of Function: Independent with all ADLs, gardening/ mowing the yard, in home exercises Visits from Start of Care: 17    Missed Visits: 0 Reporting Period : 18 to 10/26/18 Date:10/26/2018 : 1955 [x]  Patient  Verified Payor: BLUE CROSS / Plan: [place] BLUE [place] FEDERAL / Product Type: PPO / In time:11:05  Out time:11:53 Total Treatment Time (min): 48 Visit #: 8 of 8 Medicare/BCBS Only Total Timed Codes (min):  38 1:1 Treatment Time:  45 SUBJECTIVE Pain Level (0-10 scale): 1.5 Any medication changes, allergies to medications, adverse drug reactions, diagnosis change, or new procedure performed?: [x] No    [] Yes (see summary sheet for update) Subjective functional status/changes:   [] No changes reported States she is not really in pain and usually she is when it is supposed to pain and is cold out. She has been doing a lot of housework preparing for the winter and has been performing her HEP/ riding her bike frequently. OBJECTIVE Modality rationale: decrease inflammation and decrease pain to improve the patients ability to ease with tolerance to ADLs Type Additional Details  
[] Estim:  []Unatt       []IFC  []Premod []Other:  []w/ice   []w/heat Position: Location:  
[] Estim: []Att    []TENS instruct  []NMES []Other:  []w/US   []w/ice   []w/heat Position: Location: []  Traction: [] Cervical       []Lumbar 
                     [] Prone          []Supine []Intermittent   []Continuous Lbs: 
[] before manual 
[] after manual  
[]  Ultrasound: []Continuous   [] Pulsed []1MHz   []3MHz W/cm2: 
Location:  
[]  Iontophoresis with dexamethasone Location: [] Take home patch  
[] In clinic  
[]  Ice     [x]  Heat   10 mins []  Ice massage 
[]  Laser  
[]  Anodyne Position: sitting Location: bilateral knees  
[]  Laser with stim 
[]  Other:  Position: Location:  
[]  Vasopneumatic Device Pressure:       [] lo [] med [] hi  
Temperature: [] lo [] med [] hi  
[] Skin assessment post-treatment:  []intact []redness- no adverse reaction 
  []redness  adverse reaction:  
 
 
 
38 min Therapeutic Exercise:  [x] See flow sheet :  
Rationale: increase strength, improve coordination, improve balance and increase proprioception to improve the patients ability to perform daily household chores. With 
 [] TE 
 [] TA 
 [] neuro 
 [] other: Patient Education: [x] Review HEP [] Progressed/Changed HEP based on:  
[] positioning   [] body mechanics   [] transfers   [] heat/ice application   
[] other:   
 
Other Objective/Functional Measures: No verbal cueing required for proper form throughout her exercises Review of HEP and performing exercises after discharge- patient verbalized understanding, handout given States she is going to get some ankle weights and continue riding her bike at home. Pain Level (0-10 scale) post treatment: 0 Summary of Care: Patient has been seen for 17 treatment sessions with a significant reduction in pain levels. She is able to perform all exercises with independence and no longer has difficulties with her daily functional activities. Updated Goals: to be achieved in 4 weeks: 1. Pt will report a decrease in at worst pain to 2/10 in the B knees to improve tolerance to ambulation. PN: 4/10 at worst 
CURRENT: MET - 1.5 2. Pt will report at least 50% improvement in overall symptoms to improve performance of ADLs. PN: 30% improvement CURRENT MET 3. Pt will perform 3 sit to stands from a chair with 1 or no UE support to improve ease of mobility. PN: reports she has to use her B UEs to help with sit to stands. CURRENT: MET: Able to perform 10x no UE on elevated mat table (10/5/18)  10/9/18, MET 10 x on low mat table with no increases in pain (10-12-18)   
4. Pt will improve FOTO score to 59 points to improve mobility in the home and community. PN: 37 points CURRENT   Progressing- 58 
 
 
ASSESSMENT/Changes in Function: Patient has shown significant improvements in strength and activity tolerance since beginning therapy. She is currently able to perform an average of 40 minutes of exercises with minimal verbal cueing each session with minimal changes in symptoms. Patient reports riding her stationary bike at home regularly along with her HEP. Patient reports no difficulties with daily functional activities. Thank you for this referral! 
   
PLAN [x]Discontinue therapy: [x]Patient has reached or is progressing toward set goals []Patient is non-compliant or has abdicated 
    []Due to lack of appreciable progress towards set goals Dari Briscoe, PT 10/26/2018  7:48 AM

## 2018-11-30 ENCOUNTER — HOSPITAL ENCOUNTER (OUTPATIENT)
Dept: LAB | Age: 63
Discharge: HOME | End: 2018-11-30
Payer: COMMERCIAL

## 2018-11-30 LAB — TSH SERPL DL<=0.05 MIU/L-ACNC: 2.77 UIU/ML (ref 0.36–3.74)

## 2018-11-30 PROCEDURE — 84443 ASSAY THYROID STIM HORMONE: CPT

## 2018-11-30 PROCEDURE — 36415 COLL VENOUS BLD VENIPUNCTURE: CPT

## 2019-01-04 ENCOUNTER — OFFICE VISIT (OUTPATIENT)
Dept: ORTHOPEDIC SURGERY | Facility: CLINIC | Age: 64
End: 2019-01-04

## 2019-01-04 VITALS
TEMPERATURE: 96.5 F | OXYGEN SATURATION: 98 % | WEIGHT: 275 LBS | HEART RATE: 73 BPM | RESPIRATION RATE: 16 BRPM | DIASTOLIC BLOOD PRESSURE: 80 MMHG | HEIGHT: 67 IN | SYSTOLIC BLOOD PRESSURE: 137 MMHG | BODY MASS INDEX: 43.16 KG/M2

## 2019-01-04 DIAGNOSIS — M25.561 RIGHT KNEE PAIN, UNSPECIFIED CHRONICITY: Primary | ICD-10-CM

## 2019-01-04 DIAGNOSIS — M25.562 PAIN IN BOTH KNEES, UNSPECIFIED CHRONICITY: ICD-10-CM

## 2019-01-04 DIAGNOSIS — M22.41 PATELLA, CHONDROMALACIA, RIGHT: ICD-10-CM

## 2019-01-04 DIAGNOSIS — M25.561 PAIN IN BOTH KNEES, UNSPECIFIED CHRONICITY: ICD-10-CM

## 2019-01-04 NOTE — PROGRESS NOTES
HISTORY OF PRESENT ILLNESS:  Traci Page is a pleasant, 20-year-old,  female who presents to the office today complaining of multiple give way occurrences of her right knee. She has had, on the day of exam, January 4, 2019, as many as 12 occurrences prior to getting to our office for her appointment. She has no history of trauma to the right knee. She has been a patient of Dr. Nelson Palomino in the past.  She has osteoarthritic findings of the left knee with moderate narrowing of the lateral joint space but also tricompartmental findings. PHYSICAL EXAM:  There is no instability on medial and lateral stressing today at bedside. There is a negative Lachmans. The patient does have a fairly conical thigh. RADIOGRAPHS:  X-rays of the right knee today reveal severe patellofemoral osteoarthritis, as well as medial joint space narrowing. PLAN:   In light of the multiple occurrences of giving way and the risk of falling, I am going to put her, empirically, in a straight leg immobilizer until an MRI can be ordered for the right knee to assess internal derangement. She will continue single-post cane-assisted ambulation. We will see her back once the MRI is available. She will use the brace at all times when ambulating.

## 2019-01-04 NOTE — PROGRESS NOTES
PHYSICAL EXAM:  There is a negative Lachmans sign. She has a positive Casey's sign. There is a 1+ effusion.

## 2019-01-08 ENCOUNTER — HOSPITAL ENCOUNTER (OUTPATIENT)
Age: 64
Discharge: HOME | End: 2019-01-08
Attending: PHYSICIAN ASSISTANT
Payer: COMMERCIAL

## 2019-01-08 DIAGNOSIS — M25.561 RIGHT KNEE PAIN, UNSPECIFIED CHRONICITY: ICD-10-CM

## 2019-01-08 PROCEDURE — 73721 MRI JNT OF LWR EXTRE W/O DYE: CPT

## 2019-01-10 ENCOUNTER — OFFICE VISIT (OUTPATIENT)
Dept: ORTHOPEDIC SURGERY | Facility: CLINIC | Age: 64
End: 2019-01-10

## 2019-01-10 VITALS
RESPIRATION RATE: 18 BRPM | BODY MASS INDEX: 44.83 KG/M2 | DIASTOLIC BLOOD PRESSURE: 84 MMHG | HEIGHT: 67 IN | TEMPERATURE: 96.2 F | SYSTOLIC BLOOD PRESSURE: 144 MMHG | WEIGHT: 285.6 LBS | HEART RATE: 71 BPM | OXYGEN SATURATION: 96 %

## 2019-01-10 DIAGNOSIS — M17.11 PRIMARY OSTEOARTHRITIS OF RIGHT KNEE: ICD-10-CM

## 2019-01-10 DIAGNOSIS — M25.561 RIGHT KNEE PAIN, UNSPECIFIED CHRONICITY: Primary | ICD-10-CM

## 2019-01-10 DIAGNOSIS — M22.41 PATELLA, CHONDROMALACIA, RIGHT: ICD-10-CM

## 2019-01-10 RX ORDER — TRIAMCINOLONE ACETONIDE 40 MG/ML
40 INJECTION, SUSPENSION INTRA-ARTICULAR; INTRAMUSCULAR ONCE
Qty: 1 ML | Refills: 0
Start: 2019-01-10 | End: 2019-01-10

## 2019-01-10 NOTE — PROGRESS NOTES
HISTORY OF PRESENT ILLNESS:  Traci Page returns for followup regarding an MRI, which was obtained of the right knee. RADIOGRAPHS:  The MRI was consistent with the followin. ACL intrasubstance degeneration versus low-grade sprain. There is some impression upon the ligament due to intercondylar spurs. Otherwise, the major ligaments of the knee are unremarkable. 2. Extensive, full-thickness cartilage loss of the patellofemoral compartment, less-extensive, patchy, high-grade cartilage defects of the lateral compartment with no significant medial compartment cartilage loss. There were large, marginal osteophytes, and a probable anterior, tibial interosseous ganglion cyst.  A small volume joint effusion was also noted. PROCEDURE:   Today, under sterile technique, after verbal and written consent were obtained and appropriate time out performed, 1 cc of Kenalog at 40 mg per mL mixed with 7 mL of Sensorcaine 0.75% was injected using the anteromedial, intraarticular approach. There were no complications. The patient tolerated the procedure well. PLAN:   Ms. Marlene Rogers is actively limiting sugar in her diet post her 2019 change in lifestyle. I discussed with her today the importance of weight loss for cardiovascular health and weight management. Her BMI is calculated currently at 44.73. We did discuss the necessity for her BMI to be in the 40 or below range to be safe for surgical intervention for when she needs the right total knee replacement. Today, based on her pain, she would like to try a cortisone injection for her symptoms. She is using a single-post cane and will continue on the left. With the patients diagnosis of patellofemoral and lateral joint space osteoarthritis, I am also going to put her in for Hymovis viscosupplementation. We will see her back in about one month. Consideration for using Hymovis within the month once approved.   Today, all her questions were answered to her satisfaction.

## 2019-02-07 ENCOUNTER — OFFICE VISIT (OUTPATIENT)
Dept: ORTHOPEDIC SURGERY | Facility: CLINIC | Age: 64
End: 2019-02-07

## 2019-02-07 VITALS
BODY MASS INDEX: 43.47 KG/M2 | DIASTOLIC BLOOD PRESSURE: 84 MMHG | HEART RATE: 67 BPM | WEIGHT: 277 LBS | RESPIRATION RATE: 15 BRPM | TEMPERATURE: 97.6 F | SYSTOLIC BLOOD PRESSURE: 133 MMHG | OXYGEN SATURATION: 100 % | HEIGHT: 67 IN

## 2019-02-07 DIAGNOSIS — M25.561 RIGHT KNEE PAIN, UNSPECIFIED CHRONICITY: Primary | ICD-10-CM

## 2019-02-07 NOTE — PROGRESS NOTES
HISTORY OF PRESENT ILLNESS:  Renee Nice returns to the office. She was hopeful for initiation of viscosupplementation in the form of a hyaluronic derivative today. However, her insurance company has not approved the medication. PLAN:   We are going to plan on reattempting an approval.  She does have [employer]. She has  as a secondary but not  for [employer]. Today, she notes doing fairly well from her right knee injection, which was provided at her last office visit. Her pain has improved overall, and she is pleased with her outcome to date. At this point, we are going to try again to pre authorize euflexxa. Today, all her questions were answered to her satisfaction.

## 2019-02-07 NOTE — PROGRESS NOTES
1. Have you been to the ER, urgent care clinic since your last visit? Hospitalized since your last visit? No    2. Have you seen or consulted any other health care providers outside of the [address] [place] [address] since your last visit? Include any pap smears or colon screening.  No

## 2019-03-01 ENCOUNTER — HOSPITAL ENCOUNTER (OUTPATIENT)
Dept: LAB | Age: 64
Discharge: HOME | End: 2019-03-01
Payer: COMMERCIAL

## 2019-03-01 LAB
ALBUMIN SERPL-MCNC: 3.6 G/DL (ref 3.4–5)
ALBUMIN/GLOB SERPL: 1.1 {RATIO} (ref 0.8–1.7)
ALP SERPL-CCNC: 63 U/L (ref 45–117)
ALT SERPL-CCNC: 21 U/L (ref 13–56)
ANION GAP SERPL CALC-SCNC: 6 MMOL/L (ref 3–18)
AST SERPL-CCNC: 11 U/L (ref 15–37)
BASOPHILS # BLD: 0 K/UL (ref 0–0.1)
BASOPHILS NFR BLD: 0 % (ref 0–2)
BILIRUB SERPL-MCNC: 0.5 MG/DL (ref 0.2–1)
BUN SERPL-MCNC: 19 MG/DL (ref 7–18)
BUN/CREAT SERPL: 30 (ref 12–20)
CALCIUM SERPL-MCNC: 9.7 MG/DL (ref 8.5–10.1)
CHLORIDE SERPL-SCNC: 107 MMOL/L (ref 100–108)
CHOLEST SERPL-MCNC: 191 MG/DL
CO2 SERPL-SCNC: 31 MMOL/L (ref 21–32)
CREAT SERPL-MCNC: 0.64 MG/DL (ref 0.6–1.3)
DIFFERENTIAL METHOD BLD: ABNORMAL
EOSINOPHIL # BLD: 0.1 K/UL (ref 0–0.4)
EOSINOPHIL NFR BLD: 2 % (ref 0–5)
ERYTHROCYTE [DISTWIDTH] IN BLOOD BY AUTOMATED COUNT: 15.6 % (ref 11.6–14.5)
GLOBULIN SER CALC-MCNC: 3.3 G/DL (ref 2–4)
GLUCOSE SERPL-MCNC: 73 MG/DL (ref 74–99)
HCT VFR BLD AUTO: 39.4 % (ref 35–45)
HDLC SERPL-MCNC: 78 MG/DL (ref 40–60)
HDLC SERPL: 2.4 {RATIO} (ref 0–5)
HGB BLD-MCNC: 13 G/DL (ref 12–16)
LDLC SERPL CALC-MCNC: 103.2 MG/DL (ref 0–100)
LIPID PROFILE,FLP: ABNORMAL
LYMPHOCYTES # BLD: 1.2 K/UL (ref 0.9–3.6)
LYMPHOCYTES NFR BLD: 24 % (ref 21–52)
MCH RBC QN AUTO: 29 PG (ref 24–34)
MCHC RBC AUTO-ENTMCNC: 33 G/DL (ref 31–37)
MCV RBC AUTO: 87.8 FL (ref 74–97)
MONOCYTES # BLD: 0.3 K/UL (ref 0.05–1.2)
MONOCYTES NFR BLD: 6 % (ref 3–10)
NEUTS SEG # BLD: 3.4 K/UL (ref 1.8–8)
NEUTS SEG NFR BLD: 68 % (ref 40–73)
PLATELET # BLD AUTO: 241 K/UL (ref 135–420)
PMV BLD AUTO: 11.9 FL (ref 9.2–11.8)
POTASSIUM SERPL-SCNC: 4.1 MMOL/L (ref 3.5–5.5)
PROT SERPL-MCNC: 6.9 G/DL (ref 6.4–8.2)
RBC # BLD AUTO: 4.49 M/UL (ref 4.2–5.3)
SODIUM SERPL-SCNC: 144 MMOL/L (ref 136–145)
TRIGL SERPL-MCNC: 49 MG/DL (ref ?–150)
VLDLC SERPL CALC-MCNC: 9.8 MG/DL
WBC # BLD AUTO: 5.1 K/UL (ref 4.6–13.2)

## 2019-03-01 PROCEDURE — 85025 COMPLETE CBC W/AUTO DIFF WBC: CPT

## 2019-03-01 PROCEDURE — 36415 COLL VENOUS BLD VENIPUNCTURE: CPT

## 2019-03-01 PROCEDURE — 80053 COMPREHEN METABOLIC PANEL: CPT

## 2019-03-01 PROCEDURE — 80061 LIPID PANEL: CPT

## 2019-04-01 ENCOUNTER — OFFICE VISIT (OUTPATIENT)
Dept: ORTHOPEDIC SURGERY | Facility: CLINIC | Age: 64
End: 2019-04-01

## 2019-04-01 VITALS
DIASTOLIC BLOOD PRESSURE: 81 MMHG | OXYGEN SATURATION: 99 % | TEMPERATURE: 96.8 F | WEIGHT: 278 LBS | HEART RATE: 67 BPM | HEIGHT: 67 IN | RESPIRATION RATE: 18 BRPM | BODY MASS INDEX: 43.63 KG/M2 | SYSTOLIC BLOOD PRESSURE: 129 MMHG

## 2019-04-01 DIAGNOSIS — M17.11 PRIMARY OSTEOARTHRITIS OF RIGHT KNEE: ICD-10-CM

## 2019-04-01 DIAGNOSIS — M25.561 RIGHT KNEE PAIN, UNSPECIFIED CHRONICITY: Primary | ICD-10-CM

## 2019-04-01 NOTE — PROGRESS NOTES
HISTORY OF PRESENT ILLNESS:  Olga Francisco presents to the office for initiation of viscosupplementation in the form of Hymovis. She could not attain approval from her insurance company for hyaluronic acid therapy. She has a history of moderately severe osteoarthritis of the right knee. She has benefitted with a cortisone injection previously. Her pain is worse when she stands for short periods and walks short distances associated with her right knee. PROCEDURE:  Using sterile technique, after verbal and written consent were obtained and appropriate time out performed, a free sample of Hymovis, 3 cc,  lot number [id number], expiration date May 31, 2019, was administered to the right knee using the anteromedial, intraarticular approach. There were no complications. The patient tolerated the procedure well. PLAN:   The patient is going to follow back in one week for continuation of care. All her questions were answered to her satisfaction.

## 2019-04-08 ENCOUNTER — OFFICE VISIT (OUTPATIENT)
Dept: ORTHOPEDIC SURGERY | Facility: CLINIC | Age: 64
End: 2019-04-08

## 2019-04-08 VITALS
HEIGHT: 67 IN | SYSTOLIC BLOOD PRESSURE: 123 MMHG | BODY MASS INDEX: 43.7 KG/M2 | WEIGHT: 278.4 LBS | DIASTOLIC BLOOD PRESSURE: 77 MMHG | RESPIRATION RATE: 16 BRPM

## 2019-04-08 DIAGNOSIS — M25.561 RIGHT KNEE PAIN, UNSPECIFIED CHRONICITY: ICD-10-CM

## 2019-04-08 DIAGNOSIS — M17.11 PRIMARY OSTEOARTHRITIS OF RIGHT KNEE: Primary | ICD-10-CM

## 2019-04-08 NOTE — PROGRESS NOTES
HISTORY OF PRESENT ILLNESS:  Shaji Britton presents to the office for completion of viscosupplementation in the form of Hymovis. She has a history of moderately severe osteoarthritis of the right knee. She has benefitted with a cortisone injection previously. Her pain is worse when she stands for short periods and walks short distances associated with her right knee. PROCEDURE:  Using sterile technique, after verbal and written consent were obtained and appropriate time out performed, a free sample of Hymovis, 3 cc,  lot number [id number], expiration date May 31, 2019, was administered to the right knee using the anteromedial, intraarticular approach. There were no complications. The patient tolerated the procedure well. PLAN:   The patient is going to follow back in six weeks for continuation of care. All her questions were answered to her satisfaction.

## 2019-05-16 ENCOUNTER — OFFICE VISIT (OUTPATIENT)
Dept: ORTHOPEDIC SURGERY | Age: 64
End: 2019-05-16

## 2019-05-16 VITALS
OXYGEN SATURATION: 99 % | SYSTOLIC BLOOD PRESSURE: 123 MMHG | DIASTOLIC BLOOD PRESSURE: 83 MMHG | HEIGHT: 67 IN | RESPIRATION RATE: 16 BRPM | TEMPERATURE: 97.6 F | BODY MASS INDEX: 43.47 KG/M2 | HEART RATE: 63 BPM | WEIGHT: 277 LBS

## 2019-05-16 DIAGNOSIS — M25.562 CHRONIC PAIN OF LEFT KNEE: Primary | ICD-10-CM

## 2019-05-16 DIAGNOSIS — G89.29 CHRONIC PAIN OF LEFT KNEE: Primary | ICD-10-CM

## 2019-05-16 DIAGNOSIS — M25.40 EFFUSION INTO JOINT: ICD-10-CM

## 2019-05-16 DIAGNOSIS — M17.0 ARTHRITIS OF BOTH KNEES: ICD-10-CM

## 2019-05-16 DIAGNOSIS — M25.662 DECREASED RANGE OF MOTION (ROM) OF LEFT KNEE: ICD-10-CM

## 2019-05-16 RX ORDER — DICLOFENAC SODIUM 10 MG/G
GEL TOPICAL 4 TIMES DAILY
COMMUNITY
End: 2019-05-16 | Stop reason: SDUPTHER

## 2019-05-16 RX ORDER — TRIAMCINOLONE ACETONIDE 40 MG/ML
40 INJECTION, SUSPENSION INTRA-ARTICULAR; INTRAMUSCULAR ONCE
Qty: 1 ML | Refills: 0
Start: 2019-05-16 | End: 2019-05-16

## 2019-05-16 RX ORDER — DICLOFENAC SODIUM 10 MG/G
GEL TOPICAL 4 TIMES DAILY
Qty: 100 G | Refills: 5 | Status: SHIPPED | OUTPATIENT
Start: 2019-05-16

## 2019-05-16 NOTE — PROGRESS NOTES
1. Have you been to the ER, urgent care clinic since your last visit? Hospitalized since your last visit? NO    2. Have you seen or consulted any other health care providers outside of the [address] [place] [address] since your last visit? Include any pap smears or colon screening.  NO

## 2019-05-16 NOTE — PROGRESS NOTES
HISTORY OF PRESENT ILLNESS:  Samira Post is here for followup with reference to left knee. She is being treated for chondromalacia patellofemoral disease of both knees. She does feel that the Voltaren medication helped her somewhat. She also, however, has the cream at home, which she has not used, but she had that previously. She notes only occasional pain in her knees. It is not severe. She has been trying to lose a little bit weight. She has lost about 3 pounds recently. PHYSICAL EXAMINATION:   Clinical examination reveals a significantly overweight, 68-year-old female in minimal discomfort. She well-aligned easily off the examination table. She has mild crepitus in the patellofemoral area of both knees with flexion and extension. She has good range of motion of the both knees to flexion of about 110°. Flexion is limited beyond this in both knees secondary to obesity. She has good collateral, as well as cruciate ligament stability of both knees. Patellofemoral compression test is negative bilaterally. She has slight lateral patellar facet tenderness bilaterally. There is a 1+ effusion of the left knee. Radiographs: 3 view of the left knee to include AP and tunnel reveals moderate patellofemoral joint space osteoarthritis as well as mild to moderate lateral joint narrowing. There was a hint of medial joint space narrowing as well in the AP view. IMPRESSION:    1. Chondromalacia patellofemoral disease both knees. 2.  Osteoarthritis of the left knee greatest in the patellofemoral joint space as well as moderate in the lateral joint space. 3.  Left knee effusion  4. Decreased range of motion of the left knee      Plan: I am currently recommending an attempt aspiration of the left knee with following injection.     Procedural: Today using sterile technique after verbal and written consent were obtained and appropriate timeout performed patient lying supine on the examination room table it is I am joined by Tera Rios LPN as my chaperone the left knee was flexed at 20 degrees on a bolster. To follow 5 cc 1% lidocaine injected through the superior lateral intra-articular approach to follow 2 cc of straw-colored aspirate removed from the knee with a slight blood tent. To follow 1 cc of Kenalog at 40 mg/mL mixed with 7 cc of 0.25% Marcaine injected through the same portal.    Patient will return to the office on an as-needed basis. We did discuss the use of hyaluronic acid in the future if she is still remains symptomatic. Today all of her questions were answered to her satisfaction copy of her x-rays reviewed and provided. Vitals:    05/16/19 1011   BP: 123/83   Pulse: 63   Resp: 16   Temp: 97.6 °F (36.4 °C)   TempSrc: Oral   SpO2: 99%   Weight: 277 lb (125.6 kg)   Height: 5' 7\" (1.702 m)   PainSc:   2       Patient Active Problem List   Diagnosis Code    Incisional hernia K43.2    Varicose veins of both legs with edema I83.893    Obesity, morbid ([facility]) E66.01     Patient Active Problem List    Diagnosis Date Noted    Obesity, morbid ([facility] [address] 06/15/2018    Varicose veins of both legs with edema 03/24/2017    Incisional hernia 07/02/2014     Current Outpatient Medications   Medication Sig Dispense Refill    diclofenac (VOLTAREN) 1 % gel Apply  to affected area four (4) times daily. 100 g 5    triamcinolone acetonide (KENALOG) 40 mg/mL injection 1 mL by IntraMUSCular route once for 1 dose. 1 mL 0    atorvastatin (LIPITOR) 40 mg tablet Take 40 mg by mouth daily.  polyethylene glycol (MIRALAX) 17 gram packet Take 17 g by mouth daily.  Aspirin, Buffered 81 mg tab Take 81 mg by mouth daily.  Indications: THROMBOSIS PREVENTION AFTER PCI      SYNTHROID 75 mcg tablet       simvastatin (ZOCOR) 20 mg tablet       BENICAR HCT 40-12.5 mg per tablet       amLODIPine (NORVASC) 5 mg tablet        Allergies   Allergen Reactions    Latex, Natural Rubber Rash    Doxycycline Shortness of Breath and Swelling    Erythromycin Unknown (comments)     Past Medical History:   Diagnosis Date    Abdominal pain     Arthritis     Colon polyp     History of fall 11/2014    Right shoulder pain, low back pain    Hypertension     Ill-defined condition     MVP resolved    MVP (mitral valve prolapse)     Osteoarthritis of knee     Thromboembolus ([address]     brenda DVT  2007    Thyroid disease     Unspecified sleep apnea     uses cpap     Past Surgical History:   Procedure Laterality Date    ABDOMEN SURGERY PROC UNLISTED      removed fatty mass    HX BREAST BIOPSY      HX HERNIA REPAIR      HX HYSTERECTOMY      HX KNEE ARTHROSCOPY Right     x2    HX ORTHOPAEDIC       Family History   Problem Relation Age of Onset    Hypertension Mother     Arthritis-osteo Mother     Heart Disease Father     Hypertension Father     Arthritis-osteo Father     Hypertension Sister     Hypertension Brother     Kidney Disease Brother     Breast Cancer Maternal Aunt      Social History     Tobacco Use    Smoking status: Former Smoker    Smokeless tobacco: Never Used   Substance Use Topics    Alcohol use: Yes     Comment: rare

## 2019-07-31 ENCOUNTER — HOSPITAL ENCOUNTER (OUTPATIENT)
Dept: NON INVASIVE DIAGNOSTICS | Age: 64
Discharge: HOME | End: 2019-07-31
Attending: NURSE PRACTITIONER
Payer: COMMERCIAL

## 2019-07-31 VITALS
WEIGHT: 277 LBS | DIASTOLIC BLOOD PRESSURE: 80 MMHG | SYSTOLIC BLOOD PRESSURE: 120 MMHG | HEIGHT: 67 IN | BODY MASS INDEX: 43.47 KG/M2

## 2019-07-31 DIAGNOSIS — R07.9 CHEST PAIN AT REST: ICD-10-CM

## 2019-07-31 LAB
STRESS BASELINE DIAS BP: 80 MMHG
STRESS BASELINE HR: 57 BPM
STRESS BASELINE SYS BP: 120 MMHG
STRESS ESTIMATED WORKLOAD: 1 METS
STRESS EXERCISE DUR MIN: NORMAL
STRESS PEAK DIAS BP: 60 MMHG
STRESS PEAK SYS BP: 120 MMHG
STRESS PERCENT HR ACHIEVED: 55 %
STRESS POST PEAK HR: 87 BPM
STRESS RATE PRESSURE PRODUCT: NORMAL BPM*MMHG
STRESS ST DEPRESSION: 0 MM
STRESS ST ELEVATION: 0 MM
STRESS TARGET HR: 157 BPM

## 2019-07-31 PROCEDURE — 93017 CV STRESS TEST TRACING ONLY: CPT

## 2019-07-31 PROCEDURE — 74011250636 HC RX REV CODE- 250/636

## 2019-07-31 RX ORDER — SODIUM CHLORIDE 0.9 % (FLUSH) 0.9 %
10 SYRINGE (ML) INJECTION AS NEEDED
Status: COMPLETED | OUTPATIENT
Start: 2019-07-31 | End: 2019-07-31

## 2019-07-31 RX ORDER — SODIUM CHLORIDE 9 MG/ML
100 INJECTION, SOLUTION INTRAVENOUS ONCE
Status: COMPLETED | OUTPATIENT
Start: 2019-07-31 | End: 2019-07-31

## 2019-07-31 RX ADMIN — Medication 10 ML: at 08:00

## 2019-07-31 RX ADMIN — SODIUM CHLORIDE 100 ML/HR: 900 INJECTION, SOLUTION INTRAVENOUS at 09:15

## 2019-07-31 RX ADMIN — REGADENOSON 0.4 MG: 0.08 INJECTION, SOLUTION INTRAVENOUS at 09:15

## 2019-07-31 NOTE — PROGRESS NOTES
Patient was injected with 85.6 millicuries 84PLG Sestamibi on 7/31/19 at 0800. Patient was injected with 23.2 millicuries 14XEQ Sestamibi on 7/31/19 at 0915. Patient's armbands were removed and placed in shred-it box.     Patient had a Nuclear Lexiscan Stress Test.

## 2019-08-29 ENCOUNTER — HOSPITAL ENCOUNTER (OUTPATIENT)
Dept: LAB | Age: 64
Discharge: HOME | End: 2019-08-29
Payer: COMMERCIAL

## 2019-08-29 LAB
C DIFF GDH STL QL: NEGATIVE
C DIFF TOX A+B STL QL IA: NEGATIVE
HEMOCCULT STL QL: NEGATIVE
INTERPRETATION: NORMAL

## 2019-08-29 PROCEDURE — 82272 OCCULT BLD FECES 1-3 TESTS: CPT

## 2019-08-29 PROCEDURE — 83631 LACTOFERRIN FECAL (QUANT): CPT

## 2019-08-29 PROCEDURE — 87338 HPYLORI STOOL AG IA: CPT

## 2019-08-29 PROCEDURE — 87449 NOS EACH ORGANISM AG IA: CPT

## 2019-08-29 PROCEDURE — 87045 FECES CULTURE AEROBIC BACT: CPT

## 2019-08-29 PROCEDURE — 87329 GIARDIA AG IA: CPT

## 2019-08-30 LAB
G LAMBLIA AG STL QL IA: NEGATIVE
SPECIMEN SOURCE: NORMAL

## 2019-08-31 LAB
BACTERIA SPEC CULT: NORMAL
H PYLORI AG STL QL IA: NEGATIVE
SERVICE CMNT-IMP: NORMAL
SPECIMEN SOURCE: NORMAL

## 2019-09-03 LAB — LACTOFERRIN, LCTFLT: 12.4 UG/ML(G) (ref 0–7.24)

## 2019-09-04 ENCOUNTER — HOSPITAL ENCOUNTER (OUTPATIENT)
Dept: LAB | Age: 64
Discharge: HOME | End: 2019-09-04
Payer: COMMERCIAL

## 2019-09-04 LAB
25(OH)D3 SERPL-MCNC: 32.9 NG/ML (ref 30–100)
ALBUMIN SERPL-MCNC: 3.9 G/DL (ref 3.4–5)
ALBUMIN/GLOB SERPL: 1.1 {RATIO} (ref 0.8–1.7)
ALP SERPL-CCNC: 76 U/L (ref 45–117)
ALT SERPL-CCNC: 22 U/L (ref 13–56)
ANION GAP SERPL CALC-SCNC: 5 MMOL/L (ref 3–18)
AST SERPL-CCNC: 15 U/L (ref 10–38)
BASOPHILS # BLD: 0 K/UL (ref 0–0.1)
BASOPHILS NFR BLD: 0 % (ref 0–2)
BILIRUB SERPL-MCNC: 0.4 MG/DL (ref 0.2–1)
BUN SERPL-MCNC: 19 MG/DL (ref 7–18)
BUN/CREAT SERPL: 26 (ref 12–20)
CALCIUM SERPL-MCNC: 9.5 MG/DL (ref 8.5–10.1)
CHLORIDE SERPL-SCNC: 107 MMOL/L (ref 100–111)
CHOLEST SERPL-MCNC: 193 MG/DL
CO2 SERPL-SCNC: 33 MMOL/L (ref 21–32)
CREAT SERPL-MCNC: 0.74 MG/DL (ref 0.6–1.3)
DIFFERENTIAL METHOD BLD: ABNORMAL
EOSINOPHIL # BLD: 0.1 K/UL (ref 0–0.4)
EOSINOPHIL NFR BLD: 2 % (ref 0–5)
ERYTHROCYTE [DISTWIDTH] IN BLOOD BY AUTOMATED COUNT: 15 % (ref 11.6–14.5)
GLOBULIN SER CALC-MCNC: 3.7 G/DL (ref 2–4)
GLUCOSE SERPL-MCNC: 88 MG/DL (ref 74–99)
HCT VFR BLD AUTO: 39.5 % (ref 35–45)
HDLC SERPL-MCNC: 75 MG/DL (ref 40–60)
HDLC SERPL: 2.6 {RATIO} (ref 0–5)
HGB BLD-MCNC: 12.9 G/DL (ref 12–16)
LDLC SERPL CALC-MCNC: 103.4 MG/DL (ref 0–100)
LIPID PROFILE,FLP: ABNORMAL
LYMPHOCYTES # BLD: 1.4 K/UL (ref 0.9–3.6)
LYMPHOCYTES NFR BLD: 26 % (ref 21–52)
MCH RBC QN AUTO: 28.4 PG (ref 24–34)
MCHC RBC AUTO-ENTMCNC: 32.7 G/DL (ref 31–37)
MCV RBC AUTO: 86.8 FL (ref 74–97)
MONOCYTES # BLD: 0.4 K/UL (ref 0.05–1.2)
MONOCYTES NFR BLD: 7 % (ref 3–10)
NEUTS SEG # BLD: 3.4 K/UL (ref 1.8–8)
NEUTS SEG NFR BLD: 65 % (ref 40–73)
PLATELET # BLD AUTO: 322 K/UL (ref 135–420)
PMV BLD AUTO: 11.7 FL (ref 9.2–11.8)
POTASSIUM SERPL-SCNC: 4.2 MMOL/L (ref 3.5–5.5)
PROT SERPL-MCNC: 7.6 G/DL (ref 6.4–8.2)
RBC # BLD AUTO: 4.55 M/UL (ref 4.2–5.3)
SODIUM SERPL-SCNC: 145 MMOL/L (ref 136–145)
TRIGL SERPL-MCNC: 73 MG/DL (ref ?–150)
TSH SERPL DL<=0.05 MIU/L-ACNC: 1.88 UIU/ML (ref 0.36–3.74)
VLDLC SERPL CALC-MCNC: 14.6 MG/DL
WBC # BLD AUTO: 5.3 K/UL (ref 4.6–13.2)

## 2019-09-04 PROCEDURE — 85025 COMPLETE CBC W/AUTO DIFF WBC: CPT

## 2019-09-04 PROCEDURE — 36415 COLL VENOUS BLD VENIPUNCTURE: CPT

## 2019-09-04 PROCEDURE — 80061 LIPID PANEL: CPT

## 2019-09-04 PROCEDURE — 80053 COMPREHEN METABOLIC PANEL: CPT

## 2019-09-04 PROCEDURE — 82306 VITAMIN D 25 HYDROXY: CPT

## 2019-09-04 PROCEDURE — 84443 ASSAY THYROID STIM HORMONE: CPT

## 2019-09-13 ENCOUNTER — HOSPITAL ENCOUNTER (OUTPATIENT)
Dept: CT IMAGING | Age: 64
Discharge: HOME | End: 2019-09-13
Attending: NURSE PRACTITIONER
Payer: COMMERCIAL

## 2019-09-13 DIAGNOSIS — R19.7 DIARRHEA: ICD-10-CM

## 2019-09-13 DIAGNOSIS — E66.9 OBESITY: ICD-10-CM

## 2019-09-13 DIAGNOSIS — R10.9 ABDOMINAL PAIN: ICD-10-CM

## 2019-09-13 PROCEDURE — 74011636320 HC RX REV CODE- 636/320: Performed by: NURSE PRACTITIONER

## 2019-09-13 PROCEDURE — 74177 CT ABD & PELVIS W/CONTRAST: CPT

## 2019-09-13 RX ADMIN — IOPAMIDOL 100 ML: 612 INJECTION, SOLUTION INTRAVENOUS at 09:09

## 2019-11-01 ENCOUNTER — HOSPITAL ENCOUNTER (OUTPATIENT)
Dept: MAMMOGRAPHY | Age: 64
Discharge: HOME | End: 2019-11-01
Attending: FAMILY MEDICINE
Payer: COMMERCIAL

## 2019-11-01 DIAGNOSIS — Z12.31 SCREENING MAMMOGRAM FOR HIGH-RISK PATIENT: ICD-10-CM

## 2019-11-01 PROCEDURE — 77063 BREAST TOMOSYNTHESIS BI: CPT

## 2020-01-06 ENCOUNTER — HOSPITAL ENCOUNTER (OUTPATIENT)
Dept: LAB | Age: 65
Discharge: HOME | End: 2020-01-06
Payer: COMMERCIAL

## 2020-01-06 LAB
CHOLEST SERPL-MCNC: 194 MG/DL
HDLC SERPL-MCNC: 77 MG/DL (ref 40–60)
HDLC SERPL: 2.5 {RATIO} (ref 0–5)
LDLC SERPL CALC-MCNC: 98.4 MG/DL (ref 0–100)
LIPID PROFILE,FLP: ABNORMAL
TRIGL SERPL-MCNC: 93 MG/DL (ref ?–150)
VLDLC SERPL CALC-MCNC: 18.6 MG/DL

## 2020-01-06 PROCEDURE — 80061 LIPID PANEL: CPT

## 2020-01-06 PROCEDURE — 36415 COLL VENOUS BLD VENIPUNCTURE: CPT

## 2020-07-08 ENCOUNTER — OFFICE VISIT (OUTPATIENT)
Dept: ORTHOPEDIC SURGERY | Facility: CLINIC | Age: 65
End: 2020-07-08

## 2020-07-08 VITALS
TEMPERATURE: 97.3 F | HEART RATE: 82 BPM | HEIGHT: 67 IN | BODY MASS INDEX: 45.01 KG/M2 | WEIGHT: 286.8 LBS | SYSTOLIC BLOOD PRESSURE: 106 MMHG | DIASTOLIC BLOOD PRESSURE: 73 MMHG

## 2020-07-08 DIAGNOSIS — M25.562 CHRONIC PAIN OF LEFT KNEE: ICD-10-CM

## 2020-07-08 DIAGNOSIS — M25.462 EFFUSION OF LEFT KNEE: ICD-10-CM

## 2020-07-08 DIAGNOSIS — G89.29 CHRONIC PAIN OF LEFT KNEE: ICD-10-CM

## 2020-07-08 DIAGNOSIS — M25.662 DECREASED RANGE OF MOTION (ROM) OF LEFT KNEE: ICD-10-CM

## 2020-07-08 DIAGNOSIS — M17.11 PRIMARY OSTEOARTHRITIS OF RIGHT KNEE: Primary | ICD-10-CM

## 2020-07-08 RX ORDER — TRIAMCINOLONE ACETONIDE 40 MG/ML
40 INJECTION, SUSPENSION INTRA-ARTICULAR; INTRAMUSCULAR ONCE
Qty: 1 ML | Refills: 0
Start: 2020-07-08 | End: 2020-07-08

## 2020-07-08 NOTE — PROGRESS NOTES
HISTORY OF PRESENT ILLNESS:  Lucius Marroquin is here for followup with reference to left knee. She is being treated for chondromalacia patellofemoral disease of both knees. She does feel that the Voltaren medication helped her somewhat. She also, however, has the cream at home, which she has not used, but she had that previously. She notes only occasional pain in her knees. It is not severe. She has been trying to lose a little bit weight. PHYSICAL EXAMINATION:   Clinical examination reveals a significantly overweight, 49-year-old female in minimal discomfort. She well-aligned easily off the examination table. She has mild crepitus in the patellofemoral area of both knees with flexion and extension. She has good range of motion of the both knees to flexion of about 110°. Flexion is limited beyond this in both knees secondary to obesity. She has good collateral, as well as cruciate ligament stability of both knees. Patellofemoral compression test is negative bilaterally. She has slight lateral patellar facet tenderness bilaterally. There is a 1+ effusion of the left knee. Radiographs: [facility] 7/8/20 3 view of the left knee to include AP and tunnel reveals moderate patellofemoral joint space osteoarthritis as well as moderate lateral joint narrowing. There was a hint of medial joint space narrowing as well in the AP view. IMPRESSION:    1. Chondromalacia patellofemoral disease both knees. 2.  Osteoarthritis of the left knee greatest in the patellofemoral joint space as well as moderate in the lateral joint space. 3.  Left knee effusion  4. Decreased range of motion of the left knee      Plan: I am currently recommending an attempt aspiration of the left knee with following injection. Chart reviewed for the following:   Shea Vila PA-C, have reviewed the History, Physical and updated the Allergic reactions for [facility] performed immediately prior to start of procedure:   ICayla PA-C, have performed the following reviews on [facility] prior to the start of the procedure:            * Patient was identified by name and date of birth   * Agreement on procedure being performed was verified  * Risks and Benefits explained to the patient  * Procedure site verified and marked as necessary  * Patient was positioned for comfort  * Consent was signed and verified             Date of procedure: 07/08/20    Time: 2:17 PM    Procedure performed by:  Shana Garcia PA-C    Provider assisted by: None     Patient assisted by: self    How tolerated by patient: tolerated the procedure well with no complications    Comments: none      Procedural: Today using sterile technique after verbal and written consent were obtained and appropriate timeout performed patient lying supine on the examination room table it is I am [facility] by [facility] as my chaperone the left knee was flexed at 20 degrees on a bolster. To follow 5 cc 1% lidocaine injected through the superior lateral intra-articular approach to follow 4 cc of straw-colored aspirate removed from the knee with a slight blood tent. To follow 1 cc of Kenalog at 40 mg/mL mixed with 7 cc of 0.25% Marcaine injected through the same portal.    Patient will return to the office on an as-needed basis. We did discuss the use of hyaluronic acid in the future if she is still remains symptomatic. Today all of her questions were answered to her satisfaction copy of her x-rays reviewed and provided.              Vitals:    07/08/20 1346   BP: 106/73   Pulse: 82   Temp: 97.3 °F (36.3 °C)   Weight: 286 lb 12.8 oz (130.1 kg)   Height: 5' 7\" (1.702 m)   PainSc:   4   PainLoc: Knee       Patient Active Problem List   Diagnosis Code    Incisional hernia K43.2    Varicose veins of both legs with edema I83.893    Obesity, morbid ([facility]) E66.01     Patient Active Problem List    Diagnosis Date Noted    Obesity, morbid ([facility] [address] 06/15/2018    Varicose veins of both legs with edema 03/24/2017    Incisional hernia 07/02/2014     Current Outpatient Medications   Medication Sig Dispense Refill    diclofenac (VOLTAREN) 1 % gel Apply  to affected area four (4) times daily. 100 g 5    polyethylene glycol (MIRALAX) 17 gram packet Take 17 g by mouth daily.  Aspirin, Buffered 81 mg tab Take 81 mg by mouth daily. Indications: THROMBOSIS PREVENTION AFTER PCI      SYNTHROID 75 mcg tablet       BENICAR HCT 40-12.5 mg per tablet       amLODIPine (NORVASC) 5 mg tablet       atorvastatin (LIPITOR) 40 mg tablet Take 40 mg by mouth daily.       simvastatin (ZOCOR) 20 mg tablet        Allergies   Allergen Reactions    Latex, Natural Rubber Rash    Doxycycline Shortness of Breath and Swelling    Erythromycin Unknown (comments)     Past Medical History:   Diagnosis Date    Abdominal pain     Arthritis     Colon polyp     History of fall 11/2014    Right shoulder pain, low back pain    Hypertension     Ill-defined condition     MVP resolved    MVP (mitral valve prolapse)     Osteoarthritis of knee     Thromboembolus ([facility]     brenda DVT  2007    Thyroid disease     Unspecified sleep apnea     uses cpap     Past Surgical History:   Procedure Laterality Date    ABDOMEN SURGERY PROC UNLISTED      removed fatty mass    HX BREAST BIOPSY      HX HERNIA REPAIR      HX HYSTERECTOMY      HX KNEE ARTHROSCOPY Right     x2    HX ORTHOPAEDIC       Family History   Problem Relation Age of Onset    Hypertension Mother     Arthritis-osteo Mother     Heart Disease Father     Hypertension Father     Arthritis-osteo Father     Hypertension Sister     Hypertension Brother     Kidney Disease Brother     Breast Cancer Maternal Aunt      Social History     Tobacco Use    Smoking status: Former Smoker    Smokeless tobacco: Never Used   Substance Use Topics    Alcohol use: Yes     Comment: rare

## 2020-07-08 NOTE — PROGRESS NOTES
Pt states that she has h/o heel spur. States that pain is random. Pt states she's been using Voltaren gel \"religiously. \" States that she is unable to make the tube last as Rx.

## 2020-07-27 ENCOUNTER — TELEPHONE (OUTPATIENT)
Dept: ORTHOPEDIC SURGERY | Age: 65
End: 2020-07-27

## 2020-07-27 DIAGNOSIS — M17.0 ARTHRITIS OF BOTH KNEES: ICD-10-CM

## 2020-07-27 DIAGNOSIS — M25.561 CHRONIC PAIN OF RIGHT KNEE: Primary | ICD-10-CM

## 2020-07-27 DIAGNOSIS — G89.29 CHRONIC PAIN OF LEFT KNEE: ICD-10-CM

## 2020-07-27 DIAGNOSIS — G89.29 CHRONIC PAIN OF RIGHT KNEE: Primary | ICD-10-CM

## 2020-07-27 DIAGNOSIS — M25.562 CHRONIC PAIN OF LEFT KNEE: ICD-10-CM

## 2020-07-27 DIAGNOSIS — M17.11 PRIMARY OSTEOARTHRITIS OF RIGHT KNEE: ICD-10-CM

## 2020-07-27 NOTE — TELEPHONE ENCOUNTER
Spoke with Tigre Atkins and he said that we could try to get Euflexxa approved. I placed the order for bilateral Euflexxa injections. I then called the patient and told her that the order had been placed and that we would call her to set up the appointments if we get approval. She then said she already had an appointment set up for this Wednesday but to go ahead and cancel it. I then went up front and had the appointment cancelled.

## 2020-07-27 NOTE — TELEPHONE ENCOUNTER
Patient called to check on status of gel injections for both knees     Best contact number for patient [phone]

## 2020-08-17 ENCOUNTER — DOCUMENTATION ONLY (OUTPATIENT)
Dept: ORTHOPEDIC SURGERY | Facility: CLINIC | Age: 65
End: 2020-08-17

## 2020-09-04 NOTE — PROGRESS NOTES
Patients Ariel Blake was originally for left knee as I filled out form incorrect, we resubmitted and got Bilat knee approved.

## 2020-09-08 ENCOUNTER — OFFICE VISIT (OUTPATIENT)
Dept: ORTHOPEDIC SURGERY | Facility: CLINIC | Age: 65
End: 2020-09-08

## 2020-09-08 VITALS
HEIGHT: 67 IN | OXYGEN SATURATION: 99 % | SYSTOLIC BLOOD PRESSURE: 117 MMHG | WEIGHT: 290 LBS | BODY MASS INDEX: 45.52 KG/M2 | HEART RATE: 74 BPM | RESPIRATION RATE: 16 BRPM | TEMPERATURE: 97.8 F | DIASTOLIC BLOOD PRESSURE: 75 MMHG

## 2020-09-08 DIAGNOSIS — G89.29 CHRONIC PAIN OF LEFT KNEE: Primary | ICD-10-CM

## 2020-09-08 DIAGNOSIS — M25.562 CHRONIC PAIN OF LEFT KNEE: Primary | ICD-10-CM

## 2020-09-08 DIAGNOSIS — G89.29 CHRONIC PAIN OF RIGHT KNEE: ICD-10-CM

## 2020-09-08 DIAGNOSIS — M25.561 CHRONIC PAIN OF RIGHT KNEE: ICD-10-CM

## 2020-09-08 RX ORDER — HYALURONATE SODIUM 10 MG/ML
2 SYRINGE (ML) INTRAARTICULAR ONCE
Qty: 2 ML | Refills: 0
Start: 2020-09-08 | End: 2020-09-08

## 2020-09-08 NOTE — PROGRESS NOTES
HISTORY OF PRESENT ILLNESS:  Kevon Carrizales is here for followup with reference to bilateral knee pain. She is being treated for chondromalacia patellofemoral disease of both knees. She does feel that the Voltaren topical medication helped her somewhat. She also, however, has the cream at home, which she has not used, but she had that previously. She notes only occasional pain in her knees. It is not severe. She has been trying to lose a little bit weight. PHYSICAL EXAMINATION:   Clinical examination reveals a significantly overweight, 22-year-old female in minimal discomfort. She well-aligned easily off the examination table. She has mild crepitus in the patellofemoral area of both knees with flexion and extension. She has good range of motion of the both knees to flexion of about 110°. Flexion is limited beyond this in both knees secondary to obesity. She has good collateral, as well as cruciate ligament stability of both knees. Patellofemoral compression test is negative bilaterally. She has slight lateral patellar facet tenderness bilaterally. There is a 1+ effusion of the left knee. Radiographs: [facility] 7/8/20 historical 3 view of the left knee to include AP and tunnel reveals moderate patellofemoral joint space osteoarthritis as well as moderate lateral joint narrowing. There was a hint of medial joint space narrowing as well in the AP view. IMPRESSION:    1. Chondromalacia patellofemoral disease both knees. 2.  Bilateral knee osteoarthritis      Plan: Currently recommending a initiation of Visco supplementation with Euflexxa to the both knees today. Chart reviewed for the following:   Isidra Vila PA-C, have reviewed the History, Physical and updated the Allergic reactions for [facility] performed immediately prior to start of procedure:   Renetta WATERS.  Julito SINCLAIR, have performed the following reviews on [facility] prior to the start of the procedure:            * Patient was identified by name and date of birth   * Agreement on procedure being performed was verified  * Risks and Benefits explained to the patient  * Procedure site verified and marked as necessary  * Patient was positioned for comfort  * Consent was signed and verified             Date of procedure: 09/08/20    Time: 330 PM    Procedure performed by:  Abdirizak Jefferson PA-C    Provider assisted by: None     Patient assisted by: self    How tolerated by patient: tolerated the procedure well with no complications    Comments: none      Procedural: Using a sterile technique and verbal and written consent obtained appropriate timeout performed 2 cc Euflexxa injected bilateral knees using anterior medial interarticular approach as there were no complications. Patient tolerated procedure well. Patient will return to the office in 1 week for continuation of care. Today of her questions were answered to her satisfaction. Vitals:    09/08/20 1516   BP: 117/75   Pulse: 74   Resp: 16   Temp: 97.8 °F (36.6 °C)   TempSrc: Temporal   SpO2: 99%   Weight: 290 lb (131.5 kg)   Height: 5' 7\" (1.702 m)   PainSc:   3   PainLoc: Knee       Patient Active Problem List   Diagnosis Code    Incisional hernia K43.2    Varicose veins of both legs with edema I83.893    Obesity, morbid ([facility]) E66.01     Patient Active Problem List    Diagnosis Date Noted    Obesity, morbid ([facility] [address] 06/15/2018    Varicose veins of both legs with edema 03/24/2017    Incisional hernia 07/02/2014     Current Outpatient Medications   Medication Sig Dispense Refill    diclofenac (VOLTAREN) 1 % gel Apply  to affected area four (4) times daily. 100 g 5    atorvastatin (LIPITOR) 40 mg tablet Take 40 mg by mouth daily.  polyethylene glycol (MIRALAX) 17 gram packet Take 17 g by mouth daily.  Aspirin, Buffered 81 mg tab Take 81 mg by mouth daily.  Indications: THROMBOSIS PREVENTION AFTER PCI  SYNTHROID 75 mcg tablet       BENICAR HCT 40-12.5 mg per tablet       amLODIPine (NORVASC) 5 mg tablet       simvastatin (ZOCOR) 20 mg tablet        Allergies   Allergen Reactions    Latex, Natural Rubber Rash    Doxycycline Shortness of Breath and Swelling    Erythromycin Unknown (comments)     Past Medical History:   Diagnosis Date    Abdominal pain     Arthritis     Colon polyp     History of fall 11/2014    Right shoulder pain, low back pain    Hypertension     Ill-defined condition     MVP resolved    MVP (mitral valve prolapse)     Osteoarthritis of knee     Thromboembolus ([address]     brenda DVT  2007    Thyroid disease     Unspecified sleep apnea     uses cpap     Past Surgical History:   Procedure Laterality Date    ABDOMEN SURGERY PROC UNLISTED      removed fatty mass    HX BREAST BIOPSY      HX HERNIA REPAIR      HX HYSTERECTOMY      HX KNEE ARTHROSCOPY Right     x2    HX ORTHOPAEDIC       Family History   Problem Relation Age of Onset    Hypertension Mother     Arthritis-osteo Mother     Heart Disease Father     Hypertension Father     Arthritis-osteo Father     Hypertension Sister     Hypertension Brother     Kidney Disease Brother     Breast Cancer Maternal Aunt      Social History     Tobacco Use    Smoking status: Former Smoker    Smokeless tobacco: Never Used   Substance Use Topics    Alcohol use: Yes     Comment: rare

## 2020-09-15 ENCOUNTER — OFFICE VISIT (OUTPATIENT)
Dept: ORTHOPEDIC SURGERY | Facility: CLINIC | Age: 65
End: 2020-09-15

## 2020-09-15 VITALS
SYSTOLIC BLOOD PRESSURE: 137 MMHG | DIASTOLIC BLOOD PRESSURE: 86 MMHG | BODY MASS INDEX: 45.2 KG/M2 | HEART RATE: 74 BPM | HEIGHT: 67 IN | TEMPERATURE: 97 F | WEIGHT: 288 LBS | OXYGEN SATURATION: 97 %

## 2020-09-15 DIAGNOSIS — M17.0 ARTHRITIS OF BOTH KNEES: Primary | ICD-10-CM

## 2020-09-15 RX ORDER — HYALURONATE SODIUM 10 MG/ML
2 SYRINGE (ML) INTRAARTICULAR ONCE
Qty: 2 ML | Refills: 0
Start: 2020-09-15 | End: 2020-09-15

## 2020-09-15 NOTE — PROGRESS NOTES
HISTORY OF PRESENT ILLNESS:  Nohemy Garza is here for followup and continuation of Euflexxa Visco supplementation treatments to both knees. She is done well from the injection last week. PHYSICAL EXAMINATION:   Clinical examination reveals a significantly overweight, 35-year-old female in minimal discomfort. She well-aligned easily off the examination table. She has mild crepitus in the patellofemoral area of both knees with flexion and extension. She has good range of motion of the both knees to flexion of about 110°. Flexion is limited beyond this in both knees secondary to obesity. She has good collateral, as well as cruciate ligament stability of both knees. Patellofemoral compression test is negative bilaterally. She has slight lateral patellar facet tenderness bilaterally. Radiographs: [facility] 7/8/20 historical 3 view of the left knee to include AP and tunnel reveals moderate patellofemoral joint space osteoarthritis as well as moderate lateral joint narrowing. There was a hint of medial joint space narrowing as well in the AP view. IMPRESSION:    1. Chondromalacia patellofemoral disease both knees. 2.  Bilateral knee osteoarthritis      Plan: Currently recommending a initiation of Visco supplementation with Euflexxa to the both knees today. Chart reviewed for the following:   Francine Vila PA-C, have reviewed the History, Physical and updated the Allergic reactions for [facility] performed immediately prior to start of procedure:   Michael WATERS.  Julito SINCLAIR, have performed the following reviews on [facility] prior to the start of the procedure:            * Patient was identified by name and date of birth   * Agreement on procedure being performed was verified  * Risks and Benefits explained to the patient  * Procedure site verified and marked as necessary  * Patient was positioned for comfort  * Consent was signed and verified             Date of procedure: 09/15/20    Time: 10:50 AM    Procedure performed by:  Moe Franco PA-C    Provider assisted by: None     Patient assisted by: self    How tolerated by patient: tolerated the procedure well with no complications    Comments: none      Procedural: Using a sterile technique and verbal and written consent obtained appropriate timeout performed 2 cc Euflexxa injected bilateral knees using anterior medial interarticular approach as there were no complications. Patient tolerated procedure well. Patient will return to the office in 1 week for continuation of care. Today of her questions were answered to her satisfaction. Vitals:    09/15/20 1050   BP: 137/86   Pulse: 74   Temp: 97 °F (36.1 °C)   SpO2: 97%   Weight: 288 lb (130.6 kg)   Height: 5' 7\" (1.702 m)   PainSc:   2   PainLoc: Knee       Patient Active Problem List   Diagnosis Code    Incisional hernia K43.2    Varicose veins of both legs with edema I83.893    Obesity, morbid ([facility]) E66.01     Patient Active Problem List    Diagnosis Date Noted    Obesity, morbid ([facility] [address] 06/15/2018    Varicose veins of both legs with edema 03/24/2017    Incisional hernia 07/02/2014     Current Outpatient Medications   Medication Sig Dispense Refill    diclofenac (VOLTAREN) 1 % gel Apply  to affected area four (4) times daily. 100 g 5    atorvastatin (LIPITOR) 40 mg tablet Take 40 mg by mouth daily.  polyethylene glycol (MIRALAX) 17 gram packet Take 17 g by mouth daily.  Aspirin, Buffered 81 mg tab Take 81 mg by mouth daily.  Indications: THROMBOSIS PREVENTION AFTER PCI      SYNTHROID 75 mcg tablet       simvastatin (ZOCOR) 20 mg tablet       BENICAR HCT 40-12.5 mg per tablet       amLODIPine (NORVASC) 5 mg tablet        Allergies   Allergen Reactions    Latex, Natural Rubber Rash    Doxycycline Shortness of Breath and Swelling    Erythromycin Unknown (comments)     Past Medical History:   Diagnosis Date    Abdominal pain     Arthritis     Colon polyp     History of fall 11/2014    Right shoulder pain, low back pain    Hypertension     Ill-defined condition     MVP resolved    MVP (mitral valve prolapse)     Osteoarthritis of knee     Thromboembolus ([address]     brenda DVT  2007    Thyroid disease     Unspecified sleep apnea     uses cpap     Past Surgical History:   Procedure Laterality Date    ABDOMEN SURGERY PROC UNLISTED      removed fatty mass    HX BREAST BIOPSY      HX HERNIA REPAIR      HX HYSTERECTOMY      HX KNEE ARTHROSCOPY Right     x2    HX ORTHOPAEDIC       Family History   Problem Relation Age of Onset    Hypertension Mother     Arthritis-osteo Mother     Heart Disease Father     Hypertension Father     Arthritis-osteo Father     Hypertension Sister     Hypertension Brother     Kidney Disease Brother     Breast Cancer Maternal Aunt      Social History     Tobacco Use    Smoking status: Former Smoker    Smokeless tobacco: Never Used   Substance Use Topics    Alcohol use: Yes     Comment: rare                                          HISTORY OF PRESENT ILLNESS:  Sotero Owens is here for followup with reference to bilateral knee pain. She is being treated for chondromalacia patellofemoral disease of both knees. She does feel that the Voltaren topical medication helped her somewhat. She also, however, has the cream at home, which she has not used, but she had that previously. She notes only occasional pain in her knees. It is not severe. She has been trying to lose a little bit weight. PHYSICAL EXAMINATION:   Clinical examination reveals a significantly overweight, 60-year-old female in minimal discomfort. She well-aligned easily off the examination table. She has mild crepitus in the patellofemoral area of both knees with flexion and extension. She has good range of motion of the both knees to flexion of about 110°.   Flexion is limited beyond this in both knees secondary to obesity. She has good collateral, as well as cruciate ligament stability of both knees. Patellofemoral compression test is negative bilaterally. She has slight lateral patellar facet tenderness bilaterally. There is a 1+ effusion of the left knee. Radiographs: [facility] 7/8/20 historical 3 view of the left knee to include AP and tunnel reveals moderate patellofemoral joint space osteoarthritis as well as moderate lateral joint narrowing. There was a hint of medial joint space narrowing as well in the AP view. IMPRESSION:    1. Chondromalacia patellofemoral disease both knees. 2.  Bilateral knee osteoarthritis      Plan: Currently recommending a initiation of Visco supplementation with Euflexxa to the both knees today. Chart reviewed for the following:   Arminda Vila PA-C, have reviewed the History, Physical and updated the Allergic reactions for [facility] performed immediately prior to start of procedure:   Tara WATERS. Julito SINCLAIR, have performed the following reviews on [facility] prior to the start of the procedure:            * Patient was identified by name and date of birth   * Agreement on procedure being performed was verified  * Risks and Benefits explained to the patient  * Procedure site verified and marked as necessary  * Patient was positioned for comfort  * Consent was signed and verified             Date of procedure: 09/15/20    Time: 330 PM    Procedure performed by:  Cale Flores PA-C    Provider assisted by: None     Patient assisted by: self    How tolerated by patient: tolerated the procedure well with no complications    Comments: none      Procedural: Using a sterile technique and verbal and written consent obtained appropriate timeout performed 2 cc Euflexxa injected bilateral knees using anterior medial interarticular approach as there were no complications. Patient tolerated procedure well.     Patient will return to the office in 1 week for continuation of care. Today of her questions were answered to her satisfaction. Vitals:    09/15/20 1050   BP: 137/86   Pulse: 74   Temp: 97 °F (36.1 °C)   SpO2: 97%   Weight: 288 lb (130.6 kg)   Height: 5' 7\" (1.702 m)   PainSc:   2   PainLoc: Knee       Patient Active Problem List   Diagnosis Code    Incisional hernia K43.2    Varicose veins of both legs with edema I83.893    Obesity, morbid ([facility]) E66.01     Patient Active Problem List    Diagnosis Date Noted    Obesity, morbid ([address] 06/15/2018    Varicose veins of both legs with edema 03/24/2017    Incisional hernia 07/02/2014     Current Outpatient Medications   Medication Sig Dispense Refill    sodium hyaluronate (SUPARTZ FX/HYALGAN/GENIVSC) 10 mg/mL syrg injection 2 mL by Intra artICUlar route once for 1 dose. 2 mL 0    diclofenac (VOLTAREN) 1 % gel Apply  to affected area four (4) times daily. 100 g 5    atorvastatin (LIPITOR) 40 mg tablet Take 40 mg by mouth daily.  polyethylene glycol (MIRALAX) 17 gram packet Take 17 g by mouth daily.  Aspirin, Buffered 81 mg tab Take 81 mg by mouth daily.  Indications: THROMBOSIS PREVENTION AFTER PCI      SYNTHROID 75 mcg tablet       simvastatin (ZOCOR) 20 mg tablet       BENICAR HCT 40-12.5 mg per tablet       amLODIPine (NORVASC) 5 mg tablet        Allergies   Allergen Reactions    Latex, Natural Rubber Rash    Doxycycline Shortness of Breath and Swelling    Erythromycin Unknown (comments)     Past Medical History:   Diagnosis Date    Abdominal pain     Arthritis     Colon polyp     History of fall 11/2014    Right shoulder pain, low back pain    Hypertension     Ill-defined condition     MVP resolved    MVP (mitral valve prolapse)     Osteoarthritis of knee     Thromboembolus ([address]     brenda DVT  2007    Thyroid disease     Unspecified sleep apnea     uses cpap     Past Surgical History:   Procedure Laterality Date    ABDOMEN SURGERY PROC UNLISTED      removed fatty mass    HX BREAST BIOPSY      HX HERNIA REPAIR      HX HYSTERECTOMY      HX KNEE ARTHROSCOPY Right     x2    HX ORTHOPAEDIC       Family History   Problem Relation Age of Onset    Hypertension Mother     Arthritis-osteo Mother     Heart Disease Father     Hypertension Father     Arthritis-osteo Father     Hypertension Sister     Hypertension Brother     Kidney Disease Brother     Breast Cancer Maternal Aunt      Social History     Tobacco Use    Smoking status: Former Smoker    Smokeless tobacco: Never Used   Substance Use Topics    Alcohol use: Yes     Comment: rare

## 2020-09-22 ENCOUNTER — OFFICE VISIT (OUTPATIENT)
Dept: ORTHOPEDIC SURGERY | Age: 65
End: 2020-09-22
Payer: COMMERCIAL

## 2020-09-22 VITALS
SYSTOLIC BLOOD PRESSURE: 117 MMHG | RESPIRATION RATE: 16 BRPM | DIASTOLIC BLOOD PRESSURE: 75 MMHG | BODY MASS INDEX: 45.48 KG/M2 | WEIGHT: 289.8 LBS | TEMPERATURE: 96.7 F | OXYGEN SATURATION: 99 % | HEART RATE: 67 BPM | HEIGHT: 67 IN

## 2020-09-22 DIAGNOSIS — G89.29 CHRONIC PAIN OF LEFT KNEE: ICD-10-CM

## 2020-09-22 DIAGNOSIS — M25.562 CHRONIC PAIN OF LEFT KNEE: ICD-10-CM

## 2020-09-22 DIAGNOSIS — G89.29 CHRONIC PAIN OF RIGHT KNEE: ICD-10-CM

## 2020-09-22 DIAGNOSIS — M17.0 ARTHRITIS OF BOTH KNEES: Primary | ICD-10-CM

## 2020-09-22 DIAGNOSIS — M25.561 CHRONIC PAIN OF RIGHT KNEE: ICD-10-CM

## 2020-09-22 PROCEDURE — 20610 DRAIN/INJ JOINT/BURSA W/O US: CPT | Performed by: PHYSICIAN ASSISTANT

## 2020-09-22 RX ORDER — HYALURONATE SODIUM 10 MG/ML
2 SYRINGE (ML) INTRAARTICULAR ONCE
Qty: 2 ML | Refills: 0
Start: 2020-09-22 | End: 2020-09-22

## 2020-09-22 NOTE — PROGRESS NOTES
HISTORY OF PRESENT ILLNESS:  Jj Cunha is here for followup and completion of Euflexxa Visco supplementation treatments to both knees. She is done well from the injection last week. PHYSICAL EXAMINATION:   Clinical examination reveals a significantly overweight, 44-year-old female in minimal discomfort. She well-aligned easily off the examination table. She has mild crepitus in the patellofemoral area of both knees with flexion and extension. She has good range of motion of the both knees to flexion of about 110°. Flexion is limited beyond this in both knees secondary to obesity. She has good collateral, as well as cruciate ligament stability of both knees. Patellofemoral compression test is negative bilaterally. She has slight lateral patellar facet tenderness bilaterally. Radiographs: [facility] 7/8/20 historical 3 view of the left knee to include AP and tunnel reveals moderate patellofemoral joint space osteoarthritis as well as moderate lateral joint narrowing. There was a hint of medial joint space narrowing as well in the AP view. IMPRESSION:    1. Chondromalacia patellofemoral disease both knees. 2.  Bilateral knee osteoarthritis      Plan: Currently recommending a initiation of Visco supplementation with Euflexxa to the both knees today. Chart reviewed for the following:   Nicolas Vila PA-C, have reviewed the History, Physical and updated the Allergic reactions for [facility] performed immediately prior to start of procedure:   Manpreet WATERS.  Julito SINCLAIR, have performed the following reviews on [facility] prior to the start of the procedure:            * Patient was identified by name and date of birth   * Agreement on procedure being performed was verified  * Risks and Benefits explained to the patient  * Procedure site verified and marked as necessary  * Patient was positioned for comfort  * Consent was signed and verified             Date of procedure: 09/22/20    Time: 1105 AM    Procedure performed by:  Nita Vasquez PA-C    Provider assisted by: None     Patient assisted by: self    How tolerated by patient: tolerated the procedure well with no complications    Comments: none      Procedural: Using a sterile technique and verbal and written consent obtained appropriate timeout performed 2 cc Euflexxa injected bilateral knees using anterior medial interarticular approach as there were no complications. Patient tolerated procedure well. Patient will return to the office in 8 week for continuation of care. Today of her questions were answered to her satisfaction. Vitals:    09/22/20 1058   BP: 117/75   Pulse: 67   Resp: 16   Temp: (!) 96.7 °F (35.9 °C)   TempSrc: Temporal   SpO2: 99%   Weight: 289 lb 12.8 oz (131.5 kg)   Height: 5' 7\" (1.702 m)   PainSc:   3   PainLoc: Knee       Patient Active Problem List   Diagnosis Code    Incisional hernia K43.2    Varicose veins of both legs with edema I83.893    Obesity, morbid ([facility]) E66.01     Patient Active Problem List    Diagnosis Date Noted    Obesity, morbid ([facility] [address] 06/15/2018    Varicose veins of both legs with edema 03/24/2017    Incisional hernia 07/02/2014     Current Outpatient Medications   Medication Sig Dispense Refill    sodium hyaluronate (SUPARTZ FX/HYALGAN/GENIVSC) 10 mg/mL syrg injection 2 mL by Intra artICUlar route once for 1 dose. 2 mL 0    diclofenac (VOLTAREN) 1 % gel Apply  to affected area four (4) times daily. 100 g 5    atorvastatin (LIPITOR) 40 mg tablet Take 40 mg by mouth daily.  polyethylene glycol (MIRALAX) 17 gram packet Take 17 g by mouth daily.  Aspirin, Buffered 81 mg tab Take 81 mg by mouth daily.  Indications: THROMBOSIS PREVENTION AFTER PCI      SYNTHROID 75 mcg tablet       simvastatin (ZOCOR) 20 mg tablet       BENICAR HCT 40-12.5 mg per tablet       amLODIPine (NORVASC) 5 mg tablet        Allergies Allergen Reactions    Latex, Natural Rubber Rash    Doxycycline Shortness of Breath and Swelling    Erythromycin Unknown (comments)     Past Medical History:   Diagnosis Date    Abdominal pain     Arthritis     Colon polyp     History of fall 11/2014    Right shoulder pain, low back pain    Hypertension     Ill-defined condition     MVP resolved    MVP (mitral valve prolapse)     Osteoarthritis of knee     Thromboembolus ([address]     brenda DVT  2007    Thyroid disease     Unspecified sleep apnea     uses cpap     Past Surgical History:   Procedure Laterality Date    ABDOMEN SURGERY PROC UNLISTED      removed fatty mass    HX BREAST BIOPSY      HX HERNIA REPAIR      HX HYSTERECTOMY      HX KNEE ARTHROSCOPY Right     x2    HX ORTHOPAEDIC       Family History   Problem Relation Age of Onset    Hypertension Mother     Arthritis-osteo Mother     Heart Disease Father     Hypertension Father     Arthritis-osteo Father     Hypertension Sister     Hypertension Brother     Kidney Disease Brother     Breast Cancer Maternal Aunt      Social History     Tobacco Use    Smoking status: Former Smoker    Smokeless tobacco: Never Used   Substance Use Topics    Alcohol use: Yes     Comment: rare                                          HISTORY OF PRESENT ILLNESS:  Ashly Urbina is here for followup with reference to bilateral knee pain. She is being treated for chondromalacia patellofemoral disease of both knees. She does feel that the Voltaren topical medication helped her somewhat. She also, however, has the cream at home, which she has not used, but she had that previously. She notes only occasional pain in her knees. It is not severe. She has been trying to lose a little bit weight. PHYSICAL EXAMINATION:   Clinical examination reveals a significantly overweight, 80-year-old female in minimal discomfort. She well-aligned easily off the examination table.   She has mild crepitus in the patellofemoral area of both knees with flexion and extension. She has good range of motion of the both knees to flexion of about 110°. Flexion is limited beyond this in both knees secondary to obesity. She has good collateral, as well as cruciate ligament stability of both knees. Patellofemoral compression test is negative bilaterally. She has slight lateral patellar facet tenderness bilaterally. There is a 1+ effusion of the left knee. Radiographs: [facility] 7/8/20 historical 3 view of the left knee to include AP and tunnel reveals moderate patellofemoral joint space osteoarthritis as well as moderate lateral joint narrowing. There was a hint of medial joint space narrowing as well in the AP view. IMPRESSION:    1. Chondromalacia patellofemoral disease both knees. 2.  Bilateral knee osteoarthritis      Plan: Currently recommending a initiation of Visco supplementation with Euflexxa to the both knees today. Chart reviewed for the following:   Jolly Vila PA-C, have reviewed the History, Physical and updated the Allergic reactions for [facility] performed immediately prior to start of procedure:   Nadja WATERS.  Julito SINCLAIR, have performed the following reviews on [facility] prior to the start of the procedure:            * Patient was identified by name and date of birth   * Agreement on procedure being performed was verified  * Risks and Benefits explained to the patient  * Procedure site verified and marked as necessary  * Patient was positioned for comfort  * Consent was signed and verified             Date of procedure: 09/22/20    Time: 330 PM    Procedure performed by:  Shana Garcia PA-C    Provider assisted by: None     Patient assisted by: self    How tolerated by patient: tolerated the procedure well with no complications    Comments: none      Procedural: Using a sterile technique and verbal and written consent obtained appropriate timeout performed 2 cc Euflexxa injected bilateral knees using anterior medial interarticular approach as there were no complications. Patient tolerated procedure well. Patient will return to the office in 1 week for continuation of care. Today of her questions were answered to her satisfaction. Vitals:    09/22/20 1058   BP: 117/75   Pulse: 67   Resp: 16   Temp: (!) 96.7 °F (35.9 °C)   TempSrc: Temporal   SpO2: 99%   Weight: 289 lb 12.8 oz (131.5 kg)   Height: 5' 7\" (1.702 m)   PainSc:   3   PainLoc: Knee       Patient Active Problem List   Diagnosis Code    Incisional hernia K43.2    Varicose veins of both legs with edema I83.893    Obesity, morbid ([facility]) E66.01     Patient Active Problem List    Diagnosis Date Noted    Obesity, morbid ([address] 06/15/2018    Varicose veins of both legs with edema 03/24/2017    Incisional hernia 07/02/2014     Current Outpatient Medications   Medication Sig Dispense Refill    sodium hyaluronate (SUPARTZ FX/HYALGAN/GENIVSC) 10 mg/mL syrg injection 2 mL by Intra artICUlar route once for 1 dose. 2 mL 0    diclofenac (VOLTAREN) 1 % gel Apply  to affected area four (4) times daily. 100 g 5    atorvastatin (LIPITOR) 40 mg tablet Take 40 mg by mouth daily.  polyethylene glycol (MIRALAX) 17 gram packet Take 17 g by mouth daily.  Aspirin, Buffered 81 mg tab Take 81 mg by mouth daily.  Indications: THROMBOSIS PREVENTION AFTER PCI      SYNTHROID 75 mcg tablet       simvastatin (ZOCOR) 20 mg tablet       BENICAR HCT 40-12.5 mg per tablet       amLODIPine (NORVASC) 5 mg tablet        Allergies   Allergen Reactions    Latex, Natural Rubber Rash    Doxycycline Shortness of Breath and Swelling    Erythromycin Unknown (comments)     Past Medical History:   Diagnosis Date    Abdominal pain     Arthritis     Colon polyp     History of fall 11/2014    Right shoulder pain, low back pain    Hypertension     Ill-defined condition     MVP resolved  MVP (mitral valve prolapse)     Osteoarthritis of knee     Thromboembolus ([facility])     brenda DVT  2007    Thyroid disease     Unspecified sleep apnea     uses cpap     Past Surgical History:   Procedure Laterality Date    ABDOMEN SURGERY PROC UNLISTED      removed fatty mass    HX BREAST BIOPSY      HX HERNIA REPAIR      HX HYSTERECTOMY      HX KNEE ARTHROSCOPY Right     x2    HX ORTHOPAEDIC       Family History   Problem Relation Age of Onset    Hypertension Mother     Arthritis-osteo Mother     Heart Disease Father     Hypertension Father     Arthritis-osteo Father     Hypertension Sister     Hypertension Brother     Kidney Disease Brother     Breast Cancer Maternal Aunt      Social History     Tobacco Use    Smoking status: Former Smoker    Smokeless tobacco: Never Used   Substance Use Topics    Alcohol use: Yes     Comment: rare

## 2020-10-16 ENCOUNTER — TRANSCRIBE ORDER (OUTPATIENT)
Dept: SCHEDULING | Age: 65
End: 2020-10-16

## 2020-10-16 DIAGNOSIS — Z12.31 VISIT FOR SCREENING MAMMOGRAM: Primary | ICD-10-CM

## 2020-11-23 ENCOUNTER — OFFICE VISIT (OUTPATIENT)
Dept: ORTHOPEDIC SURGERY | Age: 65
End: 2020-11-23
Payer: COMMERCIAL

## 2020-11-23 VITALS
SYSTOLIC BLOOD PRESSURE: 128 MMHG | TEMPERATURE: 96.8 F | BODY MASS INDEX: 45.36 KG/M2 | HEART RATE: 67 BPM | DIASTOLIC BLOOD PRESSURE: 78 MMHG | RESPIRATION RATE: 16 BRPM | OXYGEN SATURATION: 95 % | WEIGHT: 289 LBS | HEIGHT: 67 IN

## 2020-11-23 DIAGNOSIS — M25.562 CHRONIC PAIN OF LEFT KNEE: Primary | ICD-10-CM

## 2020-11-23 DIAGNOSIS — M25.561 CHRONIC PAIN OF RIGHT KNEE: ICD-10-CM

## 2020-11-23 DIAGNOSIS — G89.29 CHRONIC PAIN OF RIGHT KNEE: ICD-10-CM

## 2020-11-23 DIAGNOSIS — G89.29 CHRONIC PAIN OF LEFT KNEE: Primary | ICD-10-CM

## 2020-11-23 PROCEDURE — 99202 OFFICE O/P NEW SF 15 MIN: CPT | Performed by: PHYSICIAN ASSISTANT

## 2020-11-23 PROCEDURE — 73562 X-RAY EXAM OF KNEE 3: CPT | Performed by: PHYSICIAN ASSISTANT

## 2020-11-23 NOTE — PROGRESS NOTES
Patient: Sarah Moore                MRN: [id number]       SSN: [id number]  YOB: 1955        AGE: 59 y.o. SEX: female          PCP: Lizeth Velazquez MD  11/23/20    Chief Complaint   Patient presents with    Hip Pain     brenda knee pain       HISTORY:  Sarah Moore is a 59 y.o. female returns to the office following for bilateral knee pain. She has a history of tricompartmental osteoarthritis of both knees. She recently completed hyaluronic acid therapy. She was doing fairly well finishing the therapy augmenting her care with Voltaren gel. She did have a fall at home without loss of consciousness from her new bed. She did up landing on her buttocks but had to roll over and crawl to the chest in the room to assist her to stand back up. Crawling on the floor caused her chronic knee pain to acutely worsen. Pain in both limits her from standing for only short periods, climbing and descending stairs, standing from a seated position, sitting from a standing position, and walking only short distances. Pain Assessment  11/23/2020   Location of Pain Knee   Location Modifiers Left;Right   Severity of Pain 2   Quality of Pain (No Data)   Quality of Pain Comment sore. Duration of Pain Persistent   Duration of Pain Comment -   Frequency of Pain Constant   Aggravating Factors Stairs; Walking;Standing;Squatting;Kneeling;Bending   Aggravating Factors Comment -   Limiting Behavior -   Relieving Factors NSAID; Exercises   Relieving Factors Comment -   Result of Injury No           No results found for: HBA1C, HGBE8, GCA2UDYQ, AKY6ZVUS  Weight Metrics 11/23/2020 9/22/2020 9/15/2020 9/8/2020 7/8/2020 7/31/2019 5/16/2019   Weight 289 lb 289 lb 12.8 oz 288 lb 290 lb 286 lb 12.8 oz 277 lb 277 lb   BMI 45.26 kg/m2 45.39 kg/m2 45.11 kg/m2 45.42 kg/m2 44.92 kg/m2 43.38 kg/m2 43.38 kg/m2            Problem List Items Addressed This Visit     None      Visit Diagnoses     Chronic pain of left knee    -  Primary    Relevant Orders    AMB POC X-RAY KNEE 3 VIEW (Completed)    Chronic pain of right knee        Relevant Orders    AMB POC X-RAY KNEE 3 VIEW (Completed)          PAST MEDICAL HISTORY:   Past Medical History:   Diagnosis Date    Abdominal pain     Arthritis     Colon polyp     History of fall 11/2014    Right shoulder pain, low back pain    Hypertension     Ill-defined condition     MVP resolved    MVP (mitral valve prolapse)     Osteoarthritis of knee     Thromboembolus ([address]     brenda DVT  2007    Thyroid disease     Unspecified sleep apnea     uses cpap       PAST SURGICAL HISTORY:   Past Surgical History:   Procedure Laterality Date    ABDOMEN SURGERY PROC UNLISTED      removed fatty mass    HX BREAST BIOPSY      HX HERNIA REPAIR      HX HYSTERECTOMY      HX KNEE ARTHROSCOPY Right     x2    HX ORTHOPAEDIC         ALLERGIES:   Allergies   Allergen Reactions    Latex, Natural Rubber Rash    Doxycycline Shortness of Breath and Swelling    Erythromycin Unknown (comments)        CURRENT MEDICATIONS:  A list of medications prior to the time of admission include:  Prior to Admission medications    Medication Sig Start Date End Date Taking? Authorizing Provider   diclofenac (VOLTAREN) 1 % gel Apply  to affected area four (4) times daily. 5/16/19  Yes Carloz Shea PA-C   atorvastatin (LIPITOR) 40 mg tablet Take 40 mg by mouth daily. Yes Provider, Historical   polyethylene glycol (MIRALAX) 17 gram packet Take 17 g by mouth daily. Yes Provider, Historical   Aspirin, Buffered 81 mg tab Take 81 mg by mouth daily.  Indications: THROMBOSIS PREVENTION AFTER PCI   Yes Provider, Historical   SYNTHROID 75 mcg tablet  4/13/14  Yes Provider, Historical   simvastatin (ZOCOR) 20 mg tablet  6/20/14  Yes Provider, Historical   BENICAR HCT 40-12.5 mg per tablet  6/17/14  Yes Provider, Historical   amLODIPine (NORVASC) 5 mg tablet  6/20/14  Yes Provider, Historical       FAMILY HISTORY:   Family History   Problem Relation Age of Onset    Hypertension Mother     Arthritis-osteo Mother     Heart Disease Father     Hypertension Father     Arthritis-osteo Father     Hypertension Sister     Hypertension Brother     Kidney Disease Brother     Breast Cancer Maternal Aunt        SOCIAL HISTORY:   Social History     Socioeconomic History    Marital status:      Spouse name: Not on file    Number of children: Not on file    Years of education: Not on file    Highest education level: Not on file   Tobacco Use    Smoking status: Former Smoker    Smokeless tobacco: Never Used   Substance and Sexual Activity    Alcohol use: Yes     Comment: rare    Drug use: No       ROS:No CP, No SOB, No fever/chills nor night sweats. No headaches, vision abnormalities to include double and oral loss of vision. No hearing abnormalities. Musculoskeletal pain per HPI. Pain is exacerbated positionally. Pt denies h/o spinal surgery, injections, or PT/chiropractor. Self treated with less than adequate relief on oral antiinflammatories. . Pt denies change in bowel or bladder habits. Pt denies fever, weight loss, or skin changes. EXAM:  Patient alert and oriented x 3,   CN II-XII grossly intact  Sitting comfortably in the exam room, interacting with conversation with pleasant affect. Breathing appears regular effortless with no visible usage of accessory muscles  Distal cap refill intact at 2/2 Igor UE / LE. Neuro intact Igor UE/LE to noxious stimuli    Ortho Specific exam:    Bilateral knees reveal no warmth erythema edema ecchymosis or effusion. She has varus deformities bilaterally. No instability MCL LCL symmetrically. Range of motion guarded both knees today with patella tracking midline moderate crepitation throughout left knee against resistance 100 degrees -10 and right knee 105 degrees -10. No calf tenderness or evidence of DVT.     Negative laxity ACL PCL.    X-rays: [facility] 11/23/2020 space 3 view of both knees reveals tricompartmental osteoarthritis with near complete joint space loss of the medial left knee and moderate of the right knee. There is severe patellofemoral changes seen bilaterally. Moderate lateral joint space loss. Spurring is seen at both the inferior and superior poles of the patella symmetrically. IMPRESSION:      ICD-10-CM ICD-9-CM    1. Chronic pain of left knee  M25.562 719.46 AMB POC X-RAY KNEE 3 VIEW    G89.29 338.29    2. Chronic pain of right knee  M25.561 719.46 AMB POC X-RAY KNEE 3 VIEW    G89.29 338.29         PLAN: Today we discussed alternatives to care to include but not limited to continuing conservative management to include stretching aggressive weight management and Voltaren gel. At this time I do not feel she is in need of cortisone to rescue her from her current symptoms. I offered physical therapy but she declined. Her and her  have made a commitment after the first of the year to be aggressive with her diet decreasing carbohydrates and increasing activities. Again we discussed options for cardiovascular health and weight maintenance to include swimming, elliptical, or stationary versus road bicycle. Today all of her questions answered to her satisfaction copies of x-rays reviewed and provided. Follow-up in 2 months. Patient provided a reminder for a \"due or due soon\" health maintenance. I have asked the patient to schedule an appointment with their primary care provider for follow-up on general health maintenance concerns. Today all the patient's questions were answered to their satisfaction. Copies of x-rays reviewed if obtained this visit, and provided to patient. Dictation disclaimer:  Please note that this dictation was completed with [employer], the [employer] voice recognition software.   Quite often unanticipated grammatical, syntax, homophones, and other interpretive errors are inadvertently transcribed by the computer software. Please disregard these errors. Please excuse any errors that have escaped final proofreading. Jt BAI, APC, MPAS, PAPAULY [facility]

## 2020-12-19 ENCOUNTER — HOSPITAL ENCOUNTER (OUTPATIENT)
Dept: MAMMOGRAPHY | Age: 65
Discharge: HOME | End: 2020-12-19
Attending: FAMILY MEDICINE
Payer: MEDICARE

## 2020-12-19 DIAGNOSIS — Z12.31 VISIT FOR SCREENING MAMMOGRAM: ICD-10-CM

## 2020-12-19 PROCEDURE — 77063 BREAST TOMOSYNTHESIS BI: CPT

## 2020-12-19 PROCEDURE — 77067 SCR MAMMO BI INCL CAD: CPT

## 2021-04-02 ENCOUNTER — HOSPITAL ENCOUNTER (OUTPATIENT)
Dept: LAB | Age: 66
Discharge: HOME | End: 2021-04-02
Payer: MEDICARE

## 2021-04-02 LAB
ALBUMIN SERPL-MCNC: 3.8 G/DL (ref 3.4–5)
ALBUMIN/GLOB SERPL: 1 {RATIO} (ref 0.8–1.7)
ALP SERPL-CCNC: 75 U/L (ref 45–117)
ALT SERPL-CCNC: 23 U/L (ref 13–56)
ANION GAP SERPL CALC-SCNC: 7 MMOL/L (ref 3–18)
AST SERPL-CCNC: 12 U/L (ref 10–38)
BASOPHILS # BLD: 0 K/UL (ref 0–0.1)
BASOPHILS NFR BLD: 0 % (ref 0–2)
BILIRUB SERPL-MCNC: 0.5 MG/DL (ref 0.2–1)
BUN SERPL-MCNC: 20 MG/DL (ref 7–18)
BUN/CREAT SERPL: 29 (ref 12–20)
CALCIUM SERPL-MCNC: 9.2 MG/DL (ref 8.5–10.1)
CHLORIDE SERPL-SCNC: 109 MMOL/L (ref 100–111)
CHOLEST SERPL-MCNC: 180 MG/DL
CO2 SERPL-SCNC: 26 MMOL/L (ref 21–32)
CREAT SERPL-MCNC: 0.7 MG/DL (ref 0.6–1.3)
DIFFERENTIAL METHOD BLD: ABNORMAL
EOSINOPHIL # BLD: 0.2 K/UL (ref 0–0.4)
EOSINOPHIL NFR BLD: 3 % (ref 0–5)
ERYTHROCYTE [DISTWIDTH] IN BLOOD BY AUTOMATED COUNT: 15.7 % (ref 11.6–14.5)
GLOBULIN SER CALC-MCNC: 3.8 G/DL (ref 2–4)
GLUCOSE SERPL-MCNC: 86 MG/DL (ref 74–99)
HCT VFR BLD AUTO: 41.3 % (ref 35–45)
HDLC SERPL-MCNC: 68 MG/DL (ref 40–60)
HDLC SERPL: 2.6 {RATIO} (ref 0–5)
HGB BLD-MCNC: 13.6 G/DL (ref 12–16)
LDLC SERPL CALC-MCNC: 93.8 MG/DL (ref 0–100)
LIPID PROFILE,FLP: ABNORMAL
LYMPHOCYTES # BLD: 1.3 K/UL (ref 0.9–3.6)
LYMPHOCYTES NFR BLD: 23 % (ref 21–52)
MCH RBC QN AUTO: 28.6 PG (ref 24–34)
MCHC RBC AUTO-ENTMCNC: 32.9 G/DL (ref 31–37)
MCV RBC AUTO: 86.8 FL (ref 74–97)
MONOCYTES # BLD: 0.4 K/UL (ref 0.05–1.2)
MONOCYTES NFR BLD: 7 % (ref 3–10)
NEUTS SEG # BLD: 3.7 K/UL (ref 1.8–8)
NEUTS SEG NFR BLD: 67 % (ref 40–73)
PLATELET # BLD AUTO: 271 K/UL (ref 135–420)
PMV BLD AUTO: 11.8 FL (ref 9.2–11.8)
POTASSIUM SERPL-SCNC: 3.9 MMOL/L (ref 3.5–5.5)
PROT SERPL-MCNC: 7.6 G/DL (ref 6.4–8.2)
RBC # BLD AUTO: 4.76 M/UL (ref 4.2–5.3)
SODIUM SERPL-SCNC: 142 MMOL/L (ref 136–145)
TRIGL SERPL-MCNC: 91 MG/DL (ref ?–150)
TSH SERPL DL<=0.05 MIU/L-ACNC: 2.14 UIU/ML (ref 0.36–3.74)
VLDLC SERPL CALC-MCNC: 18.2 MG/DL
WBC # BLD AUTO: 5.6 K/UL (ref 4.6–13.2)

## 2021-04-02 PROCEDURE — 85025 COMPLETE CBC W/AUTO DIFF WBC: CPT

## 2021-04-02 PROCEDURE — 84443 ASSAY THYROID STIM HORMONE: CPT

## 2021-04-02 PROCEDURE — 36415 COLL VENOUS BLD VENIPUNCTURE: CPT

## 2021-04-02 PROCEDURE — 80053 COMPREHEN METABOLIC PANEL: CPT

## 2021-04-02 PROCEDURE — 80061 LIPID PANEL: CPT

## 2021-05-14 ENCOUNTER — HOSPITAL ENCOUNTER (OUTPATIENT)
Dept: LAB | Age: 66
Discharge: HOME | End: 2021-05-14
Payer: MEDICARE

## 2021-05-14 PROCEDURE — U0003 INFECTIOUS AGENT DETECTION BY NUCLEIC ACID (DNA OR RNA); SEVERE ACUTE RESPIRATORY SYNDROME CORONAVIRUS 2 (SARS-COV-2) (CORONAVIRUS DISEASE [COVID-19]), AMPLIFIED PROBE TECHNIQUE, MAKING USE OF HIGH THROUGHPUT TECHNOLOGIES AS DESCRIBED BY CMS-2020-01-R: HCPCS

## 2021-05-15 LAB — SARS-COV-2, COV2NT: NOT DETECTED

## 2021-05-19 ENCOUNTER — ANESTHESIA EVENT (OUTPATIENT)
Dept: ENDOSCOPY | Age: 66
End: 2021-05-19
Payer: MEDICARE

## 2021-05-20 ENCOUNTER — HOSPITAL ENCOUNTER (OUTPATIENT)
Age: 66
Setting detail: OUTPATIENT SURGERY
Discharge: HOME | End: 2021-05-20
Attending: INTERNAL MEDICINE | Admitting: INTERNAL MEDICINE
Payer: MEDICARE

## 2021-05-20 ENCOUNTER — ANESTHESIA (OUTPATIENT)
Dept: ENDOSCOPY | Age: 66
End: 2021-05-20
Payer: MEDICARE

## 2021-05-20 VITALS
TEMPERATURE: 97.4 F | BODY MASS INDEX: 44.89 KG/M2 | HEART RATE: 55 BPM | DIASTOLIC BLOOD PRESSURE: 68 MMHG | HEIGHT: 67 IN | OXYGEN SATURATION: 98 % | RESPIRATION RATE: 12 BRPM | SYSTOLIC BLOOD PRESSURE: 126 MMHG | WEIGHT: 286 LBS

## 2021-05-20 PROCEDURE — 74011250636 HC RX REV CODE- 250/636: Performed by: NURSE ANESTHETIST, CERTIFIED REGISTERED

## 2021-05-20 PROCEDURE — 2709999900 HC NON-CHARGEABLE SUPPLY: Performed by: INTERNAL MEDICINE

## 2021-05-20 PROCEDURE — 74011000250 HC RX REV CODE- 250: Performed by: NURSE ANESTHETIST, CERTIFIED REGISTERED

## 2021-05-20 PROCEDURE — 00812 ANES LWR INTST SCR COLSC: CPT | Performed by: NURSE ANESTHETIST, CERTIFIED REGISTERED

## 2021-05-20 PROCEDURE — 76040000019: Performed by: INTERNAL MEDICINE

## 2021-05-20 PROCEDURE — 00812 ANES LWR INTST SCR COLSC: CPT | Performed by: ANESTHESIOLOGY

## 2021-05-20 PROCEDURE — 77030008565 HC TBNG SUC IRR ERBE -B: Performed by: INTERNAL MEDICINE

## 2021-05-20 PROCEDURE — 76060000031 HC ANESTHESIA FIRST 0.5 HR: Performed by: INTERNAL MEDICINE

## 2021-05-20 PROCEDURE — 77030040934 HC CATH DIAG DXTERITY MEDT -A: Performed by: INTERNAL MEDICINE

## 2021-05-20 RX ORDER — LIDOCAINE HYDROCHLORIDE 10 MG/ML
0.1 INJECTION, SOLUTION EPIDURAL; INFILTRATION; INTRACAUDAL; PERINEURAL AS NEEDED
Status: DISCONTINUED | OUTPATIENT
Start: 2021-05-20 | End: 2021-05-20 | Stop reason: HOSPADM

## 2021-05-20 RX ORDER — SODIUM CHLORIDE, SODIUM LACTATE, POTASSIUM CHLORIDE, CALCIUM CHLORIDE 600; 310; 30; 20 MG/100ML; MG/100ML; MG/100ML; MG/100ML
75 INJECTION, SOLUTION INTRAVENOUS CONTINUOUS
Status: DISCONTINUED | OUTPATIENT
Start: 2021-05-20 | End: 2021-05-20 | Stop reason: HOSPADM

## 2021-05-20 RX ORDER — SODIUM CHLORIDE 0.9 % (FLUSH) 0.9 %
5-40 SYRINGE (ML) INJECTION EVERY 8 HOURS
Status: DISCONTINUED | OUTPATIENT
Start: 2021-05-20 | End: 2021-05-20 | Stop reason: HOSPADM

## 2021-05-20 RX ORDER — INSULIN LISPRO 100 [IU]/ML
INJECTION, SOLUTION INTRAVENOUS; SUBCUTANEOUS ONCE
Status: DISCONTINUED | OUTPATIENT
Start: 2021-05-20 | End: 2021-05-20 | Stop reason: HOSPADM

## 2021-05-20 RX ORDER — LIDOCAINE HYDROCHLORIDE 20 MG/ML
INJECTION, SOLUTION EPIDURAL; INFILTRATION; INTRACAUDAL; PERINEURAL AS NEEDED
Status: DISCONTINUED | OUTPATIENT
Start: 2021-05-20 | End: 2021-05-20 | Stop reason: HOSPADM

## 2021-05-20 RX ORDER — SODIUM CHLORIDE 0.9 % (FLUSH) 0.9 %
5-40 SYRINGE (ML) INJECTION AS NEEDED
Status: DISCONTINUED | OUTPATIENT
Start: 2021-05-20 | End: 2021-05-20 | Stop reason: HOSPADM

## 2021-05-20 RX ORDER — PROPOFOL 10 MG/ML
INJECTION, EMULSION INTRAVENOUS AS NEEDED
Status: DISCONTINUED | OUTPATIENT
Start: 2021-05-20 | End: 2021-05-20 | Stop reason: HOSPADM

## 2021-05-20 RX ADMIN — PROPOFOL 25 MG: 10 INJECTION, EMULSION INTRAVENOUS at 13:19

## 2021-05-20 RX ADMIN — PROPOFOL 50 MG: 10 INJECTION, EMULSION INTRAVENOUS at 13:14

## 2021-05-20 RX ADMIN — PROPOFOL 50 MG: 10 INJECTION, EMULSION INTRAVENOUS at 13:16

## 2021-05-20 RX ADMIN — SODIUM CHLORIDE, SODIUM LACTATE, POTASSIUM CHLORIDE, AND CALCIUM CHLORIDE 75 ML/HR: 600; 310; 30; 20 INJECTION, SOLUTION INTRAVENOUS at 12:59

## 2021-05-20 RX ADMIN — PROPOFOL 25 MG: 10 INJECTION, EMULSION INTRAVENOUS at 13:18

## 2021-05-20 RX ADMIN — FAMOTIDINE 20 MG: 10 INJECTION INTRAVENOUS at 13:00

## 2021-05-20 RX ADMIN — PROPOFOL 25 MG: 10 INJECTION, EMULSION INTRAVENOUS at 13:17

## 2021-05-20 RX ADMIN — PROPOFOL 100 MG: 10 INJECTION, EMULSION INTRAVENOUS at 13:11

## 2021-05-20 RX ADMIN — LIDOCAINE HYDROCHLORIDE 50 MG: 20 INJECTION, SOLUTION EPIDURAL; INFILTRATION; INTRACAUDAL; PERINEURAL at 13:11

## 2021-05-20 NOTE — H&P
History and physical has been reviewed. The patient has been examined. There have been no significant clinical changes since the completion of the originally dated History and Physical.    Juan Diego Harp MD  [facility].  [email or web address]  Phone: [phone]  Cell: [phone]

## 2021-05-20 NOTE — ANESTHESIA PREPROCEDURE EVALUATION
Relevant Problems   No relevant active problems       Anesthetic History   No history of anesthetic complications            Review of Systems / Medical History  Patient summary reviewed and pertinent labs reviewed    Pulmonary        Sleep apnea: CPAP           Neuro/Psych              Cardiovascular    Hypertension: well controlled              Exercise tolerance: <4 METS  Comments: H/o DVT    EF 59%, neg NST  2019   GI/Hepatic/Renal                Endo/Other      Hypothyroidism: well controlled  Morbid obesity and arthritis     Other Findings              Physical Exam    Airway  Mallampati: II  TM Distance: > 6 cm  Neck ROM: normal range of motion   Mouth opening: Normal     Cardiovascular  Regular rate and rhythm,  S1 and S2 normal,  no murmur, click, rub, or gallop             Dental  No notable dental hx       Pulmonary  Breath sounds clear to auscultation               Abdominal  GI exam deferred       Other Findings            Anesthetic Plan    ASA: 3  Anesthesia type: MAC          Induction: Intravenous  Anesthetic plan and risks discussed with: Patient

## 2021-05-20 NOTE — ANESTHESIA POSTPROCEDURE EVALUATION
Procedure(s):  COLONOSCOPY.     MAC    Anesthesia Post Evaluation      Multimodal analgesia: multimodal analgesia used between 6 hours prior to anesthesia start to PACU discharge  Patient location during evaluation: PACU  Patient participation: complete - patient participated  Level of consciousness: awake  Pain score: 1  Pain management: adequate  Airway patency: patent  Anesthetic complications: no  Cardiovascular status: acceptable  Respiratory status: acceptable  Hydration status: acceptable  Post anesthesia nausea and vomiting:  none  Final Post Anesthesia Temperature Assessment:  Normothermia (36.0-37.5 degrees C)      INITIAL Post-op Vital signs:   Vitals Value Taken Time   BP 96/68 05/20/21 1350   Temp 36.2 °C (97.2 °F) 05/20/21 1337   Pulse 61 05/20/21 1350   Resp 14 05/20/21 1350   SpO2 100 % 05/20/21 1350

## 2021-05-20 NOTE — PROCEDURES
[email or web address]  [phone]        Brief Procedure Note    Monika Marr  1955  [id number]    Date of Procedure: 5/20/2021    Preoperative diagnosis: History of polyps [Z12.11]    Postoperative diagnosis: Diverticula    Description of Findings: same    Sedation/Anesthesia: Monitored Anesthesia Care; See Anesthesia Note    Procedure: Procedure(s):  COLONOSCOPY    :  Dr. Ollie Garcia MD    Assistant(s): Endoscopy Technician-1: Ayah Hernandez  Endoscopy RN-1: Sher Schmid RN    EBL:None    Specimens: * No specimens in log *    Findings: See printed and scanned procedure note    Complications: None    Tissue Implant Device: None    Dr. Ollie Garcia MD  5/20/2021  1:29 PM    Ollie Garcia MD  [facility] [address] [phone]  [email or web address]

## 2021-05-20 NOTE — DISCHARGE INSTRUCTIONS
Patient Education        Learning About Diverticulosis and Diverticulitis  What are diverticulosis and diverticulitis? In diverticulosis and diverticulitis, pouches called diverticula form in the wall of the large intestine, or colon. · In diverticulosis, the pouches do not cause any pain or other symptoms. · In diverticulitis, the pouches get inflamed or infected and cause symptoms. Doctors aren't sure what causes these pouches in the colon. But they think that a low-fiber diet may play a role. Without fiber to add bulk to the stool, the colon has to work harder than normal to push the stool forward. The pressure from this may cause pouches to form in weak spots along the colon. Some people with diverticulosis get diverticulitis. But experts don't know why this happens. What are the symptoms? · In diverticulosis, most people don't have symptoms. But pouches sometimes bleed. · In diverticulitis, symptoms may last from a few hours to a week or more. They include:  ? Belly pain. This is usually in the lower left side. It is sometimes worse when you move. This is the most common symptom. ? Fever and chills. ? Bloating and gas. ? Diarrhea or constipation. ? Nausea and sometimes vomiting.  ? Not feeling like eating. How can you prevent diverticulitis? You may be able to lower your chance of getting diverticulitis. You can do this by taking steps to prevent constipation. · Eat fruits, vegetables, beans, and whole grains every day. These foods are high in fiber. · Drink plenty of fluids. If you have kidney, heart, or liver disease and have to limit fluids, talk with your doctor before you increase the amount of fluids you drink. · Get at least 30 minutes of exercise on most days of the week. Walking is a good choice. You also may want to do other activities, such as running, swimming, cycling, or playing tennis or team sports.   · Take a fiber supplement, such as Citrucel or Metamucil, every day if needed. Read and follow all instructions on the label. · Schedule time each day for a bowel movement. Having a daily routine may help. Take your time and do not strain when having a bowel movement. Some people avoid nuts, seeds, berries, and popcorn. They believe that these foods might get trapped in the diverticula and cause pain. But there is no proof that these foods cause diverticulitis or make it worse. How are these problems treated? · The best way to treat diverticulosis is to avoid constipation. · Treatment for diverticulitis includes antibiotics. It often includes a change in your diet. You may need only liquids at first. Your doctor may suggest pain medicines for pain or belly cramps. In some cases, surgery may be needed. Follow-up care is a key part of your treatment and safety. Be sure to make and go to all appointments, and call your doctor if you are having problems. It's also a good idea to know your test results and keep a list of the medicines you take. Where can you learn more? Go to http://www.gray.com/  Enter D262 in the search box to learn more about \"Learning About Diverticulosis and Diverticulitis. \"  Current as of: April 15, 2020               Content Version: 12.8  © 2006-2021 [employer]. Care instructions adapted under license by [employer] (which disclaims liability or warranty for this information). If you have questions about a medical condition or this instruction, always ask your healthcare professional. [place] [address] any warranty or liability for your use of this information.

## 2021-10-18 ENCOUNTER — OFFICE VISIT (OUTPATIENT)
Dept: ORTHOPEDIC SURGERY | Age: 66
End: 2021-10-18
Payer: MEDICARE

## 2021-10-18 VITALS
HEIGHT: 67 IN | OXYGEN SATURATION: 99 % | HEART RATE: 61 BPM | TEMPERATURE: 97.3 F | RESPIRATION RATE: 16 BRPM | WEIGHT: 290.6 LBS | BODY MASS INDEX: 45.61 KG/M2

## 2021-10-18 DIAGNOSIS — M25.562 CHRONIC PAIN OF LEFT KNEE: Primary | ICD-10-CM

## 2021-10-18 DIAGNOSIS — M25.562 CHRONIC PAIN OF LEFT KNEE: ICD-10-CM

## 2021-10-18 DIAGNOSIS — G89.29 CHRONIC PAIN OF LEFT KNEE: Primary | ICD-10-CM

## 2021-10-18 DIAGNOSIS — G89.29 CHRONIC PAIN OF LEFT KNEE: ICD-10-CM

## 2021-10-18 PROCEDURE — 3017F COLORECTAL CA SCREEN DOC REV: CPT | Performed by: PHYSICIAN ASSISTANT

## 2021-10-18 PROCEDURE — 99212 OFFICE O/P EST SF 10 MIN: CPT | Performed by: PHYSICIAN ASSISTANT

## 2021-10-18 PROCEDURE — G9899 SCRN MAM PERF RSLTS DOC: HCPCS | Performed by: PHYSICIAN ASSISTANT

## 2021-10-18 PROCEDURE — 1101F PT FALLS ASSESS-DOCD LE1/YR: CPT | Performed by: PHYSICIAN ASSISTANT

## 2021-10-18 PROCEDURE — G8536 NO DOC ELDER MAL SCRN: HCPCS | Performed by: PHYSICIAN ASSISTANT

## 2021-10-18 PROCEDURE — G8428 CUR MEDS NOT DOCUMENT: HCPCS | Performed by: PHYSICIAN ASSISTANT

## 2021-10-18 PROCEDURE — 73562 X-RAY EXAM OF KNEE 3: CPT | Performed by: PHYSICIAN ASSISTANT

## 2021-10-18 PROCEDURE — G8432 DEP SCR NOT DOC, RNG: HCPCS | Performed by: PHYSICIAN ASSISTANT

## 2021-10-18 PROCEDURE — G8399 PT W/DXA RESULTS DOCUMENT: HCPCS | Performed by: PHYSICIAN ASSISTANT

## 2021-10-18 PROCEDURE — 1090F PRES/ABSN URINE INCON ASSESS: CPT | Performed by: PHYSICIAN ASSISTANT

## 2021-10-18 PROCEDURE — G8417 CALC BMI ABV UP PARAM F/U: HCPCS | Performed by: PHYSICIAN ASSISTANT

## 2021-10-18 RX ORDER — TRIAMCINOLONE ACETONIDE 40 MG/ML
40 INJECTION, SUSPENSION INTRA-ARTICULAR; INTRAMUSCULAR ONCE
Status: CANCELLED | OUTPATIENT
Start: 2021-10-18 | End: 2021-10-18

## 2021-10-18 NOTE — PROGRESS NOTES
Marlene Castro returns the office for follow-up regarding her left knee. She has a history of left knee pain that is benefited previously with hyaluronic acid therapies as well as cortisone. Today she reports at rest no pain to the left knee and can modify her activity successfully to keep her pain well under control. Essentially she has limited herself and walking only short distances and climb a in the descending stairs as well as discontinuing any squatting or stooping and no crawling. Today we discussed alternatives to care to include but not limited to a low-dose cortisone injection which is not indicated since she is essentially asymptomatic at her visit today however, I would like to request preauthorization of hyaluronic acid and that she has severe tricompartmental osteoarthritis of the left knee[facility] of the left knee reveals near bone-on-bone contact of the lateral joint space and severe patellofemoral narrowing. There is moderate narrowing of the medial joint space. Plan: We will plan on seeing her back once the HA has been approved. She was also discussed today the importance of weight management and calorie reduction as well as elimination of sugars. All of her questions answered to her satisfaction a copy of her x-rays reviewed and provided.

## 2021-12-14 ENCOUNTER — OFFICE VISIT (OUTPATIENT)
Dept: ORTHOPEDIC SURGERY | Age: 66
End: 2021-12-14
Payer: MEDICARE

## 2021-12-14 VITALS — WEIGHT: 288.8 LBS | TEMPERATURE: 96.9 F | BODY MASS INDEX: 45.33 KG/M2 | HEIGHT: 67 IN

## 2021-12-14 DIAGNOSIS — M17.0 ARTHRITIS OF BOTH KNEES: Primary | ICD-10-CM

## 2021-12-14 PROCEDURE — 20611 DRAIN/INJ JOINT/BURSA W/US: CPT | Performed by: PHYSICIAN ASSISTANT

## 2021-12-14 PROCEDURE — 1101F PT FALLS ASSESS-DOCD LE1/YR: CPT | Performed by: PHYSICIAN ASSISTANT

## 2021-12-14 PROCEDURE — G8432 DEP SCR NOT DOC, RNG: HCPCS | Performed by: PHYSICIAN ASSISTANT

## 2021-12-14 PROCEDURE — G9899 SCRN MAM PERF RSLTS DOC: HCPCS | Performed by: PHYSICIAN ASSISTANT

## 2021-12-14 PROCEDURE — G8399 PT W/DXA RESULTS DOCUMENT: HCPCS | Performed by: PHYSICIAN ASSISTANT

## 2021-12-14 PROCEDURE — 99213 OFFICE O/P EST LOW 20 MIN: CPT | Performed by: PHYSICIAN ASSISTANT

## 2021-12-14 PROCEDURE — G8417 CALC BMI ABV UP PARAM F/U: HCPCS | Performed by: PHYSICIAN ASSISTANT

## 2021-12-14 PROCEDURE — G8536 NO DOC ELDER MAL SCRN: HCPCS | Performed by: PHYSICIAN ASSISTANT

## 2021-12-14 PROCEDURE — 3017F COLORECTAL CA SCREEN DOC REV: CPT | Performed by: PHYSICIAN ASSISTANT

## 2021-12-14 PROCEDURE — 1090F PRES/ABSN URINE INCON ASSESS: CPT | Performed by: PHYSICIAN ASSISTANT

## 2021-12-14 PROCEDURE — G8427 DOCREV CUR MEDS BY ELIG CLIN: HCPCS | Performed by: PHYSICIAN ASSISTANT

## 2021-12-14 NOTE — PROGRESS NOTES
Patient: Laurel Briseno                MRN: [id number]       SSN: [id number]  YOB: 1955        AGE: 77 y.o. SEX: female          PCP: Candie Arndt NP  12/14/21    Chief Complaint   Patient presents with    Knee Pain     Bilat       HISTORY:  Laurel Briseno is a 77 y.o. female return to the office for initiation of hyaluronic acid therapy in the form of Euflexxa to both knees today. She has osteoarthritis of both knees and has benefited in the past with cortisone injections.   She is actively participating in a weight loss program.      Pain Assessment  12/14/2021   Location of Pain Knee   Location Modifiers Right;Left   Severity of Pain 2   Quality of Pain Dull   Quality of Pain Comment -   Duration of Pain A few minutes   Duration of Pain Comment -   Frequency of Pain Intermittent   Aggravating Factors Standing;Walking   Aggravating Factors Comment -   Limiting Behavior Yes   Relieving Factors Other (Comment)   Relieving Factors Comment Volteren gel   Result of Injury No           No results found for: HBA1C, YEU3PRUS, TGQ3QRIB  Weight Metrics 12/14/2021 10/18/2021 5/20/2021 11/23/2020 9/22/2020 9/15/2020 9/8/2020   Weight 288 lb 12.8 oz 290 lb 9.6 oz 286 lb 289 lb 289 lb 12.8 oz 288 lb 290 lb   BMI 45.23 kg/m2 45.51 kg/m2 44.79 kg/m2 45.26 kg/m2 45.39 kg/m2 45.11 kg/m2 45.42 kg/m2            Problem List Items Addressed This Visit     None      Visit Diagnoses     Arthritis of both knees    -  Primary    Relevant Medications    sodium hyaluronate (SUPARTZ FX/EUFLEXXA/HYALGAN) 10 mg/mL injection syrg 20 mg (Start on 12/14/2021  3:00 PM)    Other Relevant Orders    ARTHROCENTESIS ASPIR&/INJ MAJOR JT/BURSA W/US          PAST MEDICAL HISTORY:   Past Medical History:   Diagnosis Date    Abdominal pain     Arthritis     Colon polyp     History of fall 11/2014    Right shoulder pain, low back pain    Hypertension     Ill-defined condition     MVP resolved    MVP (mitral valve prolapse)     Osteoarthritis of knee     Thromboembolus (HCC)     brenda DVT  2007    Thyroid disease     Unspecified sleep apnea     uses cpap       PAST SURGICAL HISTORY:   Past Surgical History:   Procedure Laterality Date    COLONOSCOPY N/A 5/20/2021    COLONOSCOPY performed by Aneesh Lewis MD at [address] HX BREAST BIOPSY Right     HX HERNIA REPAIR      HX HYSTERECTOMY      HX KNEE ARTHROSCOPY Right     x2    NM ABDOMEN SURGERY PROC UNLISTED      removed fatty mass       ALLERGIES:   Allergies   Allergen Reactions    Latex, Natural Rubber Rash    Doxycycline Shortness of Breath and Swelling    Erythromycin Unknown (comments)        CURRENT MEDICATIONS:  A list of medications prior to the time of admission include:  Prior to Admission medications    Medication Sig Start Date End Date Taking? Authorizing Provider   diclofenac (VOLTAREN) 1 % gel Apply  to affected area four (4) times daily. 5/16/19  Yes Nat PatientDOTTIE   atorvastatin (LIPITOR) 40 mg tablet Take 40 mg by mouth daily. Yes Provider, Historical   polyethylene glycol (MIRALAX) 17 gram packet Take 17 g by mouth as needed. Yes Provider, Historical   Aspirin, Buffered 81 mg tab Take 81 mg by mouth daily. Indications: THROMBOSIS PREVENTION AFTER PCI   Yes Provider, Historical   SYNTHROID 75 mcg tablet Take 75 mcg by mouth Daily (before breakfast). 4/13/14  Yes Provider, Historical   BENICAR HCT 40-12.5 mg per tablet Take 1 Tab by mouth daily. 6/17/14  Yes Provider, Historical   amLODIPine (NORVASC) 5 mg tablet Take 5 mg by mouth daily.  6/20/14  Yes Provider, Historical       FAMILY HISTORY:   Family History   Problem Relation Age of Onset    Hypertension Mother     OSTEOARTHRITIS Mother     Heart Disease Father     Hypertension Father     OSTEOARTHRITIS Father     Hypertension Sister     Hypertension Brother     Kidney Disease Brother     Breast Cancer Maternal Aunt        SOCIAL HISTORY:   Social History     Socioeconomic History    Marital status:    Tobacco Use    Smoking status: Former Smoker     Quit date:      Years since quittin.9    Smokeless tobacco: Never Used   Vaping Use    Vaping Use: Never used   Substance and Sexual Activity    Alcohol use: Yes     Comment: rare    Drug use: Never       ROS:No CP, No SOB, No fever/chills nor night sweats. No headaches, vision abnormalities to include double and oral loss of vision. No hearing abnormalities. Musculoskeletal pain per HPI. Pain is exacerbated positionally. Pt denies h/o spinal surgery, injections, or PT/chiropractor. Self treated with less than adequate relief on oral antiinflammatories. . Pt denies change in bowel or bladder habits. Pt denies fever, weight loss, or skin changes. PHYSICAL EXAM:    Visit Vitals  Temp 96.9 °F (36.1 °C) (Temporal)   Ht 5' 7\" (1.702 m)   Wt 288 lb 12.8 oz (131 kg)   BMI 45.23 kg/m²       Constitutional: Appears well-developed and well-nourished. No distress. Sitting comfortably in the exam room, interacting with conversation with pleasant affect. Gait is steady and patient exhibits no evidence of ataxia. Patient is able to ambulate without difficulty. No focal neurological deficit noted. No facial droop, slurred speech, or evidence of altered mentation noted on exam.   Skin: Skin over the head, neck, bilateral limbs, and trunk is warm and dry. No rash or erythema noted. Cranial Nerves II-XII grossly intact  HENT: NC/AT. Normal symmetry, bulk and tone of facial and neck musculature. Trachea midline. No discernible thyromegaly or masses. No involuntary movements. Lymphatic: No preauricular, submandibuar, anterior or posterior cervical lymphadenopathy. Psychiatric: The patient is awake, alert, and oriented to person, place and time. Behavior is normal. Thought content normal.   Cardiovascular: No clubbing, cyanosis.   No edema bilateral lower extremities. Pulmonary: No tripoding nor accessory muscle recruitment. Breathing normally, no distress, no audible wheezing. Distal cap refill intact at 2/2 Igor UE / LE. Neuro intact Igor UE/LE to noxious stimuli        Ortho Specific exam:    Bilateral knees reveal no warmth, erythema, edema, or ecchymosis. She has a trace effusion of the right knee and no effusion of the left. Seated on table both knees flexed at 90 degrees she can extend -10 degrees to full symmetrically of the knee and flex back to 100 degrees with pain reproduced in both knees later greater in the left than the right. Patella tracks midline. No instability on varus or valgus resting. X-ray: [facility] historical bilaterally tricompartmental osteoarthritis noted. IMPRESSION:      ICD-10-CM ICD-9-CM    1. Arthritis of both knees  M17.0 716.96 sodium hyaluronate (SUPARTZ FX/EUFLEXXA/HYALGAN) 10 mg/mL injection syrg 20 mg      ARTHROCENTESIS ASPIR&/INJ MAJOR JT/BURSA W/US        PLAN: Today recommending initiation of hyaluronic acid in the form of Euflexxa for both knees. Patient agreed. Patient to follow 1 week for continuation of care. Procedural: Using sterile technique and verbal and written consent were obtained appropriate timeout formed patient sitting exam room table bilateral knees flexed at 90 degrees and using the anterior medial interarticular approaches 2 cc of Euflexxa injected with no complications. Patient tolerated the procedure well. Chart reviewed for the following:   Jeffrey Vila PA-C, have reviewed the History, Physical and updated the Allergic reactions for [facility] performed immediately prior to start of procedure:   Jerry WATERS.  Julito SINCLAIR, have performed the following reviews on [facility] prior to the start of the procedure:            * Patient was identified by name and date of birth   * Agreement on procedure being performed was verified  * Risks and Benefits explained to the patient  * Procedure site verified and marked as necessary  * Patient was positioned for comfort  * Consent was signed and verified             Date of procedure: 12/14/21    Time: 2:12 PM    Procedure performed by:  Mariah Weldon PA-C    Provider assisted by: None     Patient assisted by: self    How tolerated by patient: tolerated the procedure well with no complications    Comments: none           Additionally today we discussed the diagnosis of obesity and the importance of weight management for both cardiovascular health. The patient was recommended to decrease carbohydrate and sugar intake. Patient recommended a formal dietary consult which they will consider and return a call to our office. In light of the patient's osteoarthritic findings I am making a recommendation for aerobic exercise to include but not limited to stationary bicycle, elliptical, therapeutic walking with good shoes and or swimming. Patient should avoid any running or jumping. If using the treadmill then recommendation for no elevation and no running or jogging. No Narcotic indicated today. Patient given pain medication for short term acute pain relief. Goal is to treat patient according to above plan and to ultimately have patient off all pain medications once appropriate. If chronic pain management is required beyond what is expected for current orthopedic problem, will refer patient to pain management.  was reviewed and will be reviewed with every medication refill request.         Patient provided a reminder for a \"due or due soon\" health maintenance. I have asked the patient to schedule an appointment with their primary care provider for follow-up on general health maintenance concerns. Today all the patient's questions were answered to their satisfaction. Copies of x-rays reviewed if obtained this visit, and provided to patient.           Dictation disclaimer:  Please note that this dictation was completed with [employer], the computer voice recognition software. Quite often unanticipated grammatical, syntax, homophones, and other interpretive errors are inadvertently transcribed by the computer software. Please disregard these errors. Please excuse any errors that have escaped final proofreading. Kateryna BAI, APC, MPAS, PA-C  [facility]

## 2021-12-15 ENCOUNTER — TRANSCRIBE ORDER (OUTPATIENT)
Dept: SCHEDULING | Age: 66
End: 2021-12-15

## 2021-12-15 DIAGNOSIS — Z12.31 VISIT FOR SCREENING MAMMOGRAM: Primary | ICD-10-CM

## 2021-12-21 ENCOUNTER — OFFICE VISIT (OUTPATIENT)
Dept: ORTHOPEDIC SURGERY | Age: 66
End: 2021-12-21
Payer: MEDICARE

## 2021-12-21 VITALS — BODY MASS INDEX: 45.64 KG/M2 | HEART RATE: 74 BPM | TEMPERATURE: 97 F | WEIGHT: 291.4 LBS | OXYGEN SATURATION: 98 %

## 2021-12-21 DIAGNOSIS — G89.29 CHRONIC PAIN OF RIGHT KNEE: ICD-10-CM

## 2021-12-21 DIAGNOSIS — M25.561 CHRONIC PAIN OF RIGHT KNEE: ICD-10-CM

## 2021-12-21 DIAGNOSIS — G89.29 CHRONIC PAIN OF LEFT KNEE: Primary | ICD-10-CM

## 2021-12-21 DIAGNOSIS — M17.0 ARTHRITIS OF BOTH KNEES: ICD-10-CM

## 2021-12-21 DIAGNOSIS — M25.562 CHRONIC PAIN OF LEFT KNEE: Primary | ICD-10-CM

## 2021-12-21 PROCEDURE — 20610 DRAIN/INJ JOINT/BURSA W/O US: CPT | Performed by: PHYSICIAN ASSISTANT

## 2021-12-21 NOTE — PROGRESS NOTES
Patient: Hetal Grant                MRN: [id number]       SSN: [id number]  YOB: 1955        AGE: 77 y.o. SEX: female          PCP: Antwon Becerril NP  12/21/21 12/21/2021: Patient returns for continuation of Euflexxa to both knees. She is done well from her first injection. Chief Complaint   Patient presents with    Knee Pain     bilat        HISTORY:  Hetla Grant is a 77 y.o. female return to the office for initiation of hyaluronic acid therapy in the form of Euflexxa to both knees today. She has osteoarthritis of both knees and has benefited in the past with cortisone injections.   She is actively participating in a weight loss program.      Pain Assessment  12/21/2021   Location of Pain Knee   Location Modifiers Left;Right   Severity of Pain 3   Quality of Pain Aching   Quality of Pain Comment -   Duration of Pain Persistent   Duration of Pain Comment -   Frequency of Pain Constant   Aggravating Factors Bending;Stretching;Kneeling;Exercise   Aggravating Factors Comment -   Limiting Behavior Yes   Relieving Factors Rest   Relieving Factors Comment -   Result of Injury -           No results found for: HBA1C, WST5MUSV, UOV5LGOV, TBD1FZBH  Weight Metrics 12/21/2021 12/14/2021 10/18/2021 5/20/2021 11/23/2020 9/22/2020 9/15/2020   Weight 291 lb 6.4 oz 288 lb 12.8 oz 290 lb 9.6 oz 286 lb 289 lb 289 lb 12.8 oz 288 lb   BMI 45.64 kg/m2 45.23 kg/m2 45.51 kg/m2 44.79 kg/m2 45.26 kg/m2 45.39 kg/m2 45.11 kg/m2            Problem List Items Addressed This Visit     None      Visit Diagnoses     Chronic pain of left knee    -  Primary    Relevant Medications    sodium hyaluronate (SUPARTZ FX/EUFLEXXA/HYALGAN) 10 mg/mL injection syrg 20 mg (Start on 12/21/2021 10:00 AM)    Other Relevant Orders    DRAIN/INJECT LARGE JOINT/BURSA    Chronic pain of right knee        Relevant Orders    DRAIN/INJECT LARGE JOINT/BURSA Arthritis of both knees        Relevant Orders    DRAIN/INJECT LARGE JOINT/BURSA          PAST MEDICAL HISTORY:   Past Medical History:   Diagnosis Date    Abdominal pain     Arthritis     Colon polyp     History of fall 11/2014    Right shoulder pain, low back pain    Hypertension     Ill-defined condition     MVP resolved    MVP (mitral valve prolapse)     Osteoarthritis of knee     Thromboembolus (HCC)     brenda DVT  2007    Thyroid disease     Unspecified sleep apnea     uses cpap       PAST SURGICAL HISTORY:   Past Surgical History:   Procedure Laterality Date    COLONOSCOPY N/A 5/20/2021    COLONOSCOPY performed by Chris Allen MD at [address] HX BREAST BIOPSY Right     HX HERNIA REPAIR      HX HYSTERECTOMY      HX KNEE ARTHROSCOPY Right     x2    MO ABDOMEN SURGERY [address] UNLISTED      removed fatty mass       ALLERGIES:   Allergies   Allergen Reactions    Latex, Natural Rubber Rash    Doxycycline Shortness of Breath and Swelling    Erythromycin Unknown (comments)        CURRENT MEDICATIONS:  A list of medications prior to the time of admission include:  Prior to Admission medications    Medication Sig Start Date End Date Taking? Authorizing Provider   diclofenac (VOLTAREN) 1 % gel Apply  to affected area four (4) times daily. 5/16/19   Yeyo Vila PA-C   atorvastatin (LIPITOR) 40 mg tablet Take 40 mg by mouth daily. Provider, Historical   polyethylene glycol (MIRALAX) 17 gram packet Take 17 g by mouth as needed. Provider, Historical   Aspirin, Buffered 81 mg tab Take 81 mg by mouth daily. Indications: THROMBOSIS PREVENTION AFTER PCI    Provider, Historical   SYNTHROID 75 mcg tablet Take 75 mcg by mouth Daily (before breakfast). 4/13/14   Provider, Historical   BENICAR HCT 40-12.5 mg per tablet Take 1 Tab by mouth daily. 6/17/14   Provider, Historical   amLODIPine (NORVASC) 5 mg tablet Take 5 mg by mouth daily.  6/20/14   Provider, Historical       FAMILY HISTORY: Family History   Problem Relation Age of Onset    Hypertension Mother     OSTEOARTHRITIS Mother     Heart Disease Father     Hypertension Father     OSTEOARTHRITIS Father     Hypertension Sister     Hypertension Brother     Kidney Disease Brother     Breast Cancer Maternal Aunt        SOCIAL HISTORY:   Social History     Socioeconomic History    Marital status:    Tobacco Use    Smoking status: Former Smoker     Quit date:      Years since quittin.9    Smokeless tobacco: Never Used   Vaping Use    Vaping Use: Never used   Substance and Sexual Activity    Alcohol use: Yes     Comment: rare    Drug use: Never       ROS:No CP, No SOB, No fever/chills nor night sweats. No headaches, vision abnormalities to include double and oral loss of vision. No hearing abnormalities. Musculoskeletal pain per HPI. Pain is exacerbated positionally. Pt denies h/o spinal surgery, injections, or PT/chiropractor. Self treated with less than adequate relief on oral antiinflammatories. . Pt denies change in bowel or bladder habits. Pt denies fever, weight loss, or skin changes. PHYSICAL EXAM:    Visit Vitals  Pulse 74   Temp 97 °F (36.1 °C) (Temporal)   Wt 291 lb 6.4 oz (132.2 kg)   SpO2 98%   BMI 45.64 kg/m²       Constitutional: Appears well-developed and well-nourished. No distress. Sitting comfortably in the exam room, interacting with conversation with pleasant affect. Gait is steady and patient exhibits no evidence of ataxia. Patient is able to ambulate without difficulty. No focal neurological deficit noted. No facial droop, slurred speech, or evidence of altered mentation noted on exam.   Skin: Skin over the head, neck, bilateral limbs, and trunk is warm and dry. No rash or erythema noted. Cranial Nerves II-XII grossly intact  HENT: NC/AT. Normal symmetry, bulk and tone of facial and neck musculature. Trachea midline. No discernible thyromegaly or masses. No involuntary movements. Lymphatic: No preauricular, submandibuar, anterior or posterior cervical lymphadenopathy. Psychiatric: The patient is awake, alert, and oriented to person, place and time. Behavior is normal. Thought content normal.   Cardiovascular: No clubbing, cyanosis. No edema bilateral lower extremities. Pulmonary: No tripoding nor accessory muscle recruitment. Breathing normally, no distress, no audible wheezing. Distal cap refill intact at 2/2 Igor UE / LE. Neuro intact Igor UE/LE to noxious stimuli        Ortho Specific exam:    Bilateral knees reveal no warmth, erythema, edema, or ecchymosis. She has a trace effusion of the right knee and no effusion of the left. Seated on table both knees flexed at 90 degrees she can extend -10 degrees to full symmetrically of the knee and flex back to 100 degrees with pain reproduced in both knees later greater in the left than the right. Patella tracks midline. No instability on varus or valgus resting. X-ray: [facility] historical bilaterally tricompartmental osteoarthritis noted. IMPRESSION:      ICD-10-CM ICD-9-CM    1. Chronic pain of left knee  M25.562 719.46 sodium hyaluronate (SUPARTZ FX/EUFLEXXA/HYALGAN) 10 mg/mL injection syrg 20 mg    G89.29 338.29 DRAIN/INJECT LARGE JOINT/BURSA   2. Chronic pain of right knee  M25.561 719.46 DRAIN/INJECT LARGE JOINT/BURSA    G89.29 338.29    3. Arthritis of both knees  M17.0 716.96 DRAIN/INJECT LARGE JOINT/BURSA        PLAN: Today recommending continuation of hyaluronic acid in the form of Euflexxa for both knees. Patient agreed. Patient to follow 1 week for continuation of care. Procedural: Using sterile technique and verbal and written consent were obtained appropriate timeout formed patient sitting exam room table bilateral knees flexed at 90 degrees and using the anterior medial interarticular approaches 2 cc of Euflexxa injected with no complications.   Patient tolerated the procedure well.       Chart reviewed for the following:   Ismael WATERS PA-C, have reviewed the History, Physical and updated the Allergic reactions for [facility] performed immediately prior to start of procedure:   Jerry WATERS. Julito SINCLAIR, have performed the following reviews on [facility] prior to the start of the procedure:            * Patient was identified by name and date of birth   * Agreement on procedure being performed was verified  * Risks and Benefits explained to the patient  * Procedure site verified and marked as necessary  * Patient was positioned for comfort  * Consent was signed and verified             Date of procedure: 12/21/21    Time: 10:45 AM    Procedure performed by:  Roger Field PA-C    Provider assisted by: None     Patient assisted by: self    How tolerated by patient: tolerated the procedure well with no complications    Comments: none           Additionally today we discussed the diagnosis of obesity and the importance of weight management for both cardiovascular health. The patient was recommended to decrease carbohydrate and sugar intake. Patient recommended a formal dietary consult which they will consider and return a call to our office. In light of the patient's osteoarthritic findings I am making a recommendation for aerobic exercise to include but not limited to stationary bicycle, elliptical, therapeutic walking with good shoes and or swimming. Patient should avoid any running or jumping. If using the treadmill then recommendation for no elevation and no running or jogging. No Narcotic indicated today. Patient given pain medication for short term acute pain relief. Goal is to treat patient according to above plan and to ultimately have patient off all pain medications once appropriate. If chronic pain management is required beyond what is expected for current orthopedic problem, will refer patient to pain management.   was reviewed and will be reviewed with every medication refill request.         Patient provided a reminder for a \"due or due soon\" health maintenance. I have asked the patient to schedule an appointment with their primary care provider for follow-up on general health maintenance concerns. Today all the patient's questions were answered to their satisfaction. Copies of x-rays reviewed if obtained this visit, and provided to patient. Dictation disclaimer:  Please note that this dictation was completed with [employer], the computer voice recognition software. Quite often unanticipated grammatical, syntax, homophones, and other interpretive errors are inadvertently transcribed by the computer software. Please disregard these errors. Please excuse any errors that have escaped final proofreading. Lon BAI, APC, MPAS, PA-C  [facility]

## 2021-12-22 ENCOUNTER — HOSPITAL ENCOUNTER (OUTPATIENT)
Dept: MAMMOGRAPHY | Age: 66
Discharge: HOME | End: 2021-12-22
Attending: NURSE PRACTITIONER
Payer: MEDICARE

## 2021-12-22 DIAGNOSIS — Z12.31 VISIT FOR SCREENING MAMMOGRAM: ICD-10-CM

## 2021-12-22 PROCEDURE — 77067 SCR MAMMO BI INCL CAD: CPT

## 2021-12-28 ENCOUNTER — OFFICE VISIT (OUTPATIENT)
Dept: ORTHOPEDIC SURGERY | Age: 66
End: 2021-12-28
Payer: MEDICARE

## 2021-12-28 VITALS
WEIGHT: 291.2 LBS | BODY MASS INDEX: 45.71 KG/M2 | OXYGEN SATURATION: 95 % | HEIGHT: 67 IN | HEART RATE: 89 BPM | TEMPERATURE: 97.8 F

## 2021-12-28 DIAGNOSIS — M17.11 PRIMARY OSTEOARTHRITIS OF RIGHT KNEE: ICD-10-CM

## 2021-12-28 DIAGNOSIS — G89.29 CHRONIC PAIN OF LEFT KNEE: Primary | ICD-10-CM

## 2021-12-28 DIAGNOSIS — M17.12 PRIMARY OSTEOARTHRITIS OF LEFT KNEE: ICD-10-CM

## 2021-12-28 DIAGNOSIS — M25.562 CHRONIC PAIN OF LEFT KNEE: Primary | ICD-10-CM

## 2021-12-28 DIAGNOSIS — G89.29 CHRONIC PAIN OF RIGHT KNEE: ICD-10-CM

## 2021-12-28 DIAGNOSIS — M25.561 CHRONIC PAIN OF RIGHT KNEE: ICD-10-CM

## 2021-12-28 PROCEDURE — 20610 DRAIN/INJ JOINT/BURSA W/O US: CPT | Performed by: SPECIALIST

## 2021-12-28 NOTE — PROGRESS NOTES
Patient: Irena Newton                MRN: [id number]       SSN: [id number]  YOB: 1955        AGE: 77 y.o. SEX: female  Body mass index is 45.61 kg/m². PCP: Haseeb Prado NP  01/03/22    Chief Complaint   Patient presents with    Knee Pain     #3 injection in both knees     HISTORY:  Irena Newton is a 77 y.o. female who is seen for bilateral knee pain. TIME OUT performed immediately prior to start of procedure:  Laure Salazar MD, have performed the following reviews on Irena Newton prior to the start of the procedure:            * Patient was identified by name and date of birth   * Agreement on procedure being performed was verified  * Risks and Benefits explained to the patient  * Procedure site verified and marked as necessary  * Patient was positioned for comfort  * Consent was obtained     Time: 10:57 AM    Date of procedure: 1/3/2022    Procedure performed by:  Ochoa Michael MD    Ms. Candie Lebron tolerated the procedure well with no complications      LSL-63-MP ICD-9-CM    1. Chronic pain of left knee  M25.562 719.46 sodium hyaluronate (SUPARTZ FX/EUFLEXXA/HYALGAN) 10 mg/mL injection syrg 20 mg    G89.29 338.29 ND DRAIN/INJECT LARGE JOINT/BURSA   2. Chronic pain of right knee  M25.561 719.46 sodium hyaluronate (SUPARTZ FX/EUFLEXXA/HYALGAN) 10 mg/mL injection syrg 20 mg    G89.29 338.29 ND DRAIN/INJECT LARGE JOINT/BURSA   3. Primary osteoarthritis of right knee  M17.11 715.16 sodium hyaluronate (SUPARTZ FX/EUFLEXXA/HYALGAN) 10 mg/mL injection syrg 20 mg      ND DRAIN/INJECT LARGE JOINT/BURSA   4. Primary osteoarthritis of left knee  M17.12 715.16 sodium hyaluronate (SUPARTZ FX/EUFLEXXA/HYALGAN) 10 mg/mL injection syrg 20 mg      ND DRAIN/INJECT LARGE JOINT/BURSA     PLAN: After discussing treatment options, patient's bilateral knees were injected with 2 cc of Euflexxa.   Ms. Candie Lebron will follow up PRN now that she has completed her visco supplementation injection series.       Scribed by MD Keith Cosme) as dictated by Robert Sidhu MD

## 2022-03-19 PROBLEM — E66.01 OBESITY, MORBID (HCC): Status: ACTIVE | Noted: 2018-06-15

## 2022-03-20 PROBLEM — I83.893 VARICOSE VEINS OF BOTH LEGS WITH EDEMA: Status: ACTIVE | Noted: 2017-03-24

## 2022-08-10 ENCOUNTER — HOSPITAL ENCOUNTER (OUTPATIENT)
Dept: LAB | Age: 67
Discharge: HOME | End: 2022-08-10
Payer: MEDICARE

## 2022-08-10 LAB
25(OH)D3 SERPL-MCNC: 27.3 NG/ML (ref 30–100)
ALBUMIN SERPL-MCNC: 3.7 G/DL (ref 3.4–5)
ALBUMIN/GLOB SERPL: 0.9 {RATIO} (ref 0.8–1.7)
ALP SERPL-CCNC: 68 U/L (ref 45–117)
ALT SERPL-CCNC: 23 U/L (ref 13–56)
ANION GAP SERPL CALC-SCNC: 4 MMOL/L (ref 3–18)
AST SERPL-CCNC: 19 U/L (ref 10–38)
BASOPHILS # BLD: 0 K/UL (ref 0–0.1)
BASOPHILS NFR BLD: 0 % (ref 0–2)
BILIRUB SERPL-MCNC: 0.6 MG/DL (ref 0.2–1)
BUN SERPL-MCNC: 19 MG/DL (ref 7–18)
BUN/CREAT SERPL: 26 (ref 12–20)
CALCIUM SERPL-MCNC: 9.5 MG/DL (ref 8.5–10.1)
CHLORIDE SERPL-SCNC: 107 MMOL/L (ref 100–111)
CHOLEST SERPL-MCNC: 181 MG/DL
CO2 SERPL-SCNC: 31 MMOL/L (ref 21–32)
CREAT SERPL-MCNC: 0.74 MG/DL (ref 0.6–1.3)
DIFFERENTIAL METHOD BLD: ABNORMAL
EOSINOPHIL # BLD: 0.2 K/UL (ref 0–0.4)
EOSINOPHIL NFR BLD: 4 % (ref 0–5)
ERYTHROCYTE [DISTWIDTH] IN BLOOD BY AUTOMATED COUNT: 15.6 % (ref 11.6–14.5)
GLOBULIN SER CALC-MCNC: 3.9 G/DL (ref 2–4)
GLUCOSE SERPL-MCNC: 85 MG/DL (ref 74–99)
HCT VFR BLD AUTO: 41.6 % (ref 35–45)
HDLC SERPL-MCNC: 72 MG/DL (ref 40–60)
HDLC SERPL: 2.5 {RATIO} (ref 0–5)
HGB BLD-MCNC: 13 G/DL (ref 12–16)
IMM GRANULOCYTES # BLD AUTO: 0 K/UL (ref 0–0.04)
IMM GRANULOCYTES NFR BLD AUTO: 0 % (ref 0–0.5)
LDLC SERPL CALC-MCNC: 93.4 MG/DL (ref 0–100)
LIPID PROFILE,FLP: ABNORMAL
LYMPHOCYTES # BLD: 1.1 K/UL (ref 0.9–3.6)
LYMPHOCYTES NFR BLD: 24 % (ref 21–52)
MCH RBC QN AUTO: 27.4 PG (ref 24–34)
MCHC RBC AUTO-ENTMCNC: 31.3 G/DL (ref 31–37)
MCV RBC AUTO: 87.8 FL (ref 78–100)
MONOCYTES # BLD: 0.4 K/UL (ref 0.05–1.2)
MONOCYTES NFR BLD: 8 % (ref 3–10)
NEUTS SEG # BLD: 3 K/UL (ref 1.8–8)
NEUTS SEG NFR BLD: 64 % (ref 40–73)
NRBC # BLD: 0 K/UL (ref 0–0.01)
NRBC BLD-RTO: 0 PER 100 WBC
PLATELET # BLD AUTO: 261 K/UL (ref 135–420)
PMV BLD AUTO: 11.7 FL (ref 9.2–11.8)
POTASSIUM SERPL-SCNC: 4.3 MMOL/L (ref 3.5–5.5)
PROT SERPL-MCNC: 7.6 G/DL (ref 6.4–8.2)
RBC # BLD AUTO: 4.74 M/UL (ref 4.2–5.3)
SODIUM SERPL-SCNC: 142 MMOL/L (ref 136–145)
T4 SERPL-MCNC: 11.2 UG/DL (ref 4.8–13.9)
TRIGL SERPL-MCNC: 78 MG/DL (ref ?–150)
TSH SERPL DL<=0.05 MIU/L-ACNC: 4.06 UIU/ML (ref 0.36–3.74)
VLDLC SERPL CALC-MCNC: 15.6 MG/DL
WBC # BLD AUTO: 4.7 K/UL (ref 4.6–13.2)

## 2022-08-10 PROCEDURE — 36415 COLL VENOUS BLD VENIPUNCTURE: CPT

## 2022-08-10 PROCEDURE — 84443 ASSAY THYROID STIM HORMONE: CPT

## 2022-08-10 PROCEDURE — 85025 COMPLETE CBC W/AUTO DIFF WBC: CPT

## 2022-08-10 PROCEDURE — 82306 VITAMIN D 25 HYDROXY: CPT

## 2022-08-10 PROCEDURE — 80061 LIPID PANEL: CPT

## 2022-08-10 PROCEDURE — 84436 ASSAY OF TOTAL THYROXINE: CPT

## 2022-08-10 PROCEDURE — 80053 COMPREHEN METABOLIC PANEL: CPT

## 2022-12-09 ENCOUNTER — TRANSCRIBE ORDER (OUTPATIENT)
Dept: SCHEDULING | Age: 67
End: 2022-12-09

## 2022-12-09 DIAGNOSIS — Z12.31 VISIT FOR SCREENING MAMMOGRAM: Primary | ICD-10-CM

## 2023-01-06 ENCOUNTER — HOSPITAL ENCOUNTER (OUTPATIENT)
Dept: MAMMOGRAPHY | Age: 68
Discharge: HOME | End: 2023-01-06
Attending: NURSE PRACTITIONER
Payer: MEDICARE

## 2023-01-06 DIAGNOSIS — Z12.31 VISIT FOR SCREENING MAMMOGRAM: ICD-10-CM

## 2023-01-06 PROCEDURE — 77063 BREAST TOMOSYNTHESIS BI: CPT

## 2023-05-15 ENCOUNTER — HOSPITAL ENCOUNTER (OUTPATIENT)
Facility: HOSPITAL | Age: 68
Discharge: HOME | End: 2023-05-18
Payer: MEDICARE

## 2023-05-15 LAB
25(OH)D3 SERPL-MCNC: 17.9 NG/ML (ref 30–100)
ALBUMIN SERPL-MCNC: 3 G/DL (ref 3.4–5)
ALBUMIN/GLOB SERPL: 0.9 (ref 0.8–1.7)
ALP SERPL-CCNC: 65 U/L (ref 45–117)
ALT SERPL-CCNC: 21 U/L (ref 13–56)
ANION GAP SERPL CALC-SCNC: 7 MMOL/L (ref 3–18)
AST SERPL-CCNC: 20 U/L (ref 10–38)
BASOPHILS # BLD: 0 K/UL (ref 0–0.1)
BASOPHILS NFR BLD: 1 % (ref 0–2)
BILIRUB SERPL-MCNC: 0.4 MG/DL (ref 0.2–1)
BUN SERPL-MCNC: 26 MG/DL (ref 7–18)
BUN/CREAT SERPL: 34 (ref 12–20)
CALCIUM SERPL-MCNC: 9.6 MG/DL (ref 8.5–10.1)
CHLORIDE SERPL-SCNC: 107 MMOL/L (ref 100–111)
CO2 SERPL-SCNC: 27 MMOL/L (ref 21–32)
CREAT SERPL-MCNC: 0.76 MG/DL (ref 0.6–1.3)
DIFFERENTIAL METHOD BLD: ABNORMAL
EOSINOPHIL # BLD: 0.2 K/UL (ref 0–0.4)
EOSINOPHIL NFR BLD: 4 % (ref 0–5)
ERYTHROCYTE [DISTWIDTH] IN BLOOD BY AUTOMATED COUNT: 16 % (ref 11.6–14.5)
GLOBULIN SER CALC-MCNC: 3.4 G/DL (ref 2–4)
GLUCOSE SERPL-MCNC: 85 MG/DL (ref 74–99)
HCT VFR BLD AUTO: 40.8 % (ref 35–45)
HGB BLD-MCNC: 12.8 G/DL (ref 12–16)
IMM GRANULOCYTES # BLD AUTO: 0 K/UL (ref 0–0.04)
IMM GRANULOCYTES NFR BLD AUTO: 0 % (ref 0–0.5)
LYMPHOCYTES # BLD: 1.2 K/UL (ref 0.9–3.6)
LYMPHOCYTES NFR BLD: 22 % (ref 21–52)
MCH RBC QN AUTO: 28.3 PG (ref 24–34)
MCHC RBC AUTO-ENTMCNC: 31.4 G/DL (ref 31–37)
MCV RBC AUTO: 90.1 FL (ref 78–100)
MONOCYTES # BLD: 0.4 K/UL (ref 0.05–1.2)
MONOCYTES NFR BLD: 8 % (ref 3–10)
NEUTS SEG # BLD: 3.5 K/UL (ref 1.8–8)
NEUTS SEG NFR BLD: 65 % (ref 40–73)
NRBC # BLD: 0 K/UL (ref 0–0.01)
NRBC BLD-RTO: 0 PER 100 WBC
PLATELET # BLD AUTO: 281 K/UL (ref 135–420)
PMV BLD AUTO: 12.2 FL (ref 9.2–11.8)
POTASSIUM SERPL-SCNC: 4.3 MMOL/L (ref 3.5–5.5)
PROT SERPL-MCNC: 6.4 G/DL (ref 6.4–8.2)
RBC # BLD AUTO: 4.53 M/UL (ref 4.2–5.3)
SODIUM SERPL-SCNC: 141 MMOL/L (ref 136–145)
T4 FREE SERPL-MCNC: 1.2 NG/DL (ref 0.7–1.5)
TSH SERPL DL<=0.05 MIU/L-ACNC: 2.93 UIU/ML (ref 0.36–3.74)
WBC # BLD AUTO: 5.4 K/UL (ref 4.6–13.2)

## 2023-05-15 PROCEDURE — 82306 VITAMIN D 25 HYDROXY: CPT

## 2023-05-15 PROCEDURE — 36415 COLL VENOUS BLD VENIPUNCTURE: CPT

## 2023-05-15 PROCEDURE — 85025 COMPLETE CBC W/AUTO DIFF WBC: CPT

## 2023-05-15 PROCEDURE — 84443 ASSAY THYROID STIM HORMONE: CPT

## 2023-05-15 PROCEDURE — 80053 COMPREHEN METABOLIC PANEL: CPT

## 2023-05-15 PROCEDURE — 84439 ASSAY OF FREE THYROXINE: CPT

## 2023-09-20 ENCOUNTER — HOSPITAL ENCOUNTER (OUTPATIENT)
Facility: HOSPITAL | Age: 68
Discharge: HOME | End: 2023-09-23
Payer: MEDICARE

## 2023-09-20 LAB
ALBUMIN SERPL-MCNC: 3.1 G/DL (ref 3.4–5)
ALBUMIN/GLOB SERPL: 0.9 (ref 0.8–1.7)
ALP SERPL-CCNC: 71 U/L (ref 45–117)
ALT SERPL-CCNC: 21 U/L (ref 13–56)
ANION GAP SERPL CALC-SCNC: 3 MMOL/L (ref 3–18)
AST SERPL-CCNC: 17 U/L (ref 10–38)
BASOPHILS # BLD: 0 K/UL (ref 0–0.1)
BASOPHILS NFR BLD: 0 % (ref 0–2)
BILIRUB SERPL-MCNC: 0.6 MG/DL (ref 0.2–1)
BUN SERPL-MCNC: 25 MG/DL (ref 7–18)
BUN/CREAT SERPL: 33 (ref 12–20)
CALCIUM SERPL-MCNC: 9.4 MG/DL (ref 8.5–10.1)
CHLORIDE SERPL-SCNC: 108 MMOL/L (ref 100–111)
CHOLEST SERPL-MCNC: 224 MG/DL
CO2 SERPL-SCNC: 30 MMOL/L (ref 21–32)
CREAT SERPL-MCNC: 0.75 MG/DL (ref 0.6–1.3)
DIFFERENTIAL METHOD BLD: ABNORMAL
EOSINOPHIL # BLD: 0.2 K/UL (ref 0–0.4)
EOSINOPHIL NFR BLD: 5 % (ref 0–5)
ERYTHROCYTE [DISTWIDTH] IN BLOOD BY AUTOMATED COUNT: 16 % (ref 11.6–14.5)
GLOBULIN SER CALC-MCNC: 3.5 G/DL (ref 2–4)
GLUCOSE SERPL-MCNC: 85 MG/DL (ref 74–99)
HCT VFR BLD AUTO: 39.3 % (ref 35–45)
HDLC SERPL-MCNC: 80 MG/DL (ref 40–60)
HDLC SERPL: 2.8 (ref 0–5)
HGB BLD-MCNC: 12.4 G/DL (ref 12–16)
IMM GRANULOCYTES # BLD AUTO: 0 K/UL (ref 0–0.04)
IMM GRANULOCYTES NFR BLD AUTO: 0 % (ref 0–0.5)
LDLC SERPL CALC-MCNC: 130 MG/DL (ref 0–100)
LIPID PANEL: ABNORMAL
LYMPHOCYTES # BLD: 1.3 K/UL (ref 0.9–3.6)
LYMPHOCYTES NFR BLD: 25 % (ref 21–52)
MCH RBC QN AUTO: 28.2 PG (ref 24–34)
MCHC RBC AUTO-ENTMCNC: 31.6 G/DL (ref 31–37)
MCV RBC AUTO: 89.5 FL (ref 78–100)
MONOCYTES # BLD: 0.4 K/UL (ref 0.05–1.2)
MONOCYTES NFR BLD: 8 % (ref 3–10)
NEUTS SEG # BLD: 3.2 K/UL (ref 1.8–8)
NEUTS SEG NFR BLD: 63 % (ref 40–73)
NRBC # BLD: 0 K/UL (ref 0–0.01)
NRBC BLD-RTO: 0 PER 100 WBC
PLATELET # BLD AUTO: 269 K/UL (ref 135–420)
PMV BLD AUTO: 12.2 FL (ref 9.2–11.8)
POTASSIUM SERPL-SCNC: 4.1 MMOL/L (ref 3.5–5.5)
PROT SERPL-MCNC: 6.6 G/DL (ref 6.4–8.2)
RBC # BLD AUTO: 4.39 M/UL (ref 4.2–5.3)
SODIUM SERPL-SCNC: 141 MMOL/L (ref 136–145)
T4 FREE SERPL-MCNC: 1.2 NG/DL (ref 0.7–1.5)
TRIGL SERPL-MCNC: 70 MG/DL
TSH SERPL DL<=0.05 MIU/L-ACNC: 2.26 UIU/ML (ref 0.36–3.74)
VLDLC SERPL CALC-MCNC: 14 MG/DL
WBC # BLD AUTO: 5.2 K/UL (ref 4.6–13.2)

## 2023-09-20 PROCEDURE — 80053 COMPREHEN METABOLIC PANEL: CPT

## 2023-09-20 PROCEDURE — 36415 COLL VENOUS BLD VENIPUNCTURE: CPT

## 2023-09-20 PROCEDURE — 84439 ASSAY OF FREE THYROXINE: CPT

## 2023-09-20 PROCEDURE — 84443 ASSAY THYROID STIM HORMONE: CPT

## 2023-09-20 PROCEDURE — 80061 LIPID PANEL: CPT

## 2023-09-20 PROCEDURE — 85025 COMPLETE CBC W/AUTO DIFF WBC: CPT

## 2023-12-28 ENCOUNTER — TRANSCRIBE ORDERS (OUTPATIENT)
Facility: HOSPITAL | Age: 68
End: 2023-12-28

## 2023-12-28 DIAGNOSIS — Z12.31 VISIT FOR SCREENING MAMMOGRAM: Primary | ICD-10-CM

## 2024-01-10 ENCOUNTER — HOSPITAL ENCOUNTER (OUTPATIENT)
Facility: HOSPITAL | Age: 69
Discharge: HOME | End: 2024-01-13
Payer: MEDICARE

## 2024-01-10 VITALS — WEIGHT: 291 LBS | BODY MASS INDEX: 45.67 KG/M2 | HEIGHT: 67 IN

## 2024-01-10 DIAGNOSIS — Z12.31 VISIT FOR SCREENING MAMMOGRAM: ICD-10-CM

## 2024-01-10 PROCEDURE — 77063 BREAST TOMOSYNTHESIS BI: CPT

## 2024-04-29 ENCOUNTER — OFFICE VISIT (OUTPATIENT)
Age: 69
End: 2024-04-29
Payer: MEDICARE

## 2024-04-29 VITALS — HEIGHT: 66 IN | WEIGHT: 287 LBS | TEMPERATURE: 98.6 F | BODY MASS INDEX: 46.12 KG/M2

## 2024-04-29 DIAGNOSIS — E66.01 OBESITY, CLASS III, BMI 40-49.9 (MORBID OBESITY) (HCC): ICD-10-CM

## 2024-04-29 DIAGNOSIS — M25.562 CHRONIC PAIN OF LEFT KNEE: Primary | ICD-10-CM

## 2024-04-29 DIAGNOSIS — M25.462 EFFUSION OF BURSA OF KNEE, LEFT: ICD-10-CM

## 2024-04-29 DIAGNOSIS — G89.29 CHRONIC PAIN OF LEFT KNEE: Primary | ICD-10-CM

## 2024-04-29 DIAGNOSIS — M17.12 ARTHRITIS OF KNEE, LEFT: ICD-10-CM

## 2024-04-29 DIAGNOSIS — M25.662 DECREASED RANGE OF MOTION (ROM) OF LEFT KNEE: ICD-10-CM

## 2024-04-29 PROCEDURE — 1036F TOBACCO NON-USER: CPT | Performed by: PHYSICIAN ASSISTANT

## 2024-04-29 PROCEDURE — 3017F COLORECTAL CA SCREEN DOC REV: CPT | Performed by: PHYSICIAN ASSISTANT

## 2024-04-29 PROCEDURE — 73562 X-RAY EXAM OF KNEE 3: CPT | Performed by: PHYSICIAN ASSISTANT

## 2024-04-29 PROCEDURE — G8417 CALC BMI ABV UP PARAM F/U: HCPCS | Performed by: PHYSICIAN ASSISTANT

## 2024-04-29 PROCEDURE — 1123F ACP DISCUSS/DSCN MKR DOCD: CPT | Performed by: PHYSICIAN ASSISTANT

## 2024-04-29 PROCEDURE — 1090F PRES/ABSN URINE INCON ASSESS: CPT | Performed by: PHYSICIAN ASSISTANT

## 2024-04-29 PROCEDURE — 99213 OFFICE O/P EST LOW 20 MIN: CPT | Performed by: PHYSICIAN ASSISTANT

## 2024-04-29 PROCEDURE — G8427 DOCREV CUR MEDS BY ELIG CLIN: HCPCS | Performed by: PHYSICIAN ASSISTANT

## 2024-04-29 PROCEDURE — 20610 DRAIN/INJ JOINT/BURSA W/O US: CPT | Performed by: PHYSICIAN ASSISTANT

## 2024-04-29 PROCEDURE — G8399 PT W/DXA RESULTS DOCUMENT: HCPCS | Performed by: PHYSICIAN ASSISTANT

## 2024-04-29 RX ORDER — BUPIVACAINE HYDROCHLORIDE 5 MG/ML
7 INJECTION, SOLUTION PERINEURAL ONCE
Status: COMPLETED | OUTPATIENT
Start: 2024-04-29 | End: 2024-04-29

## 2024-04-29 RX ORDER — TRIAMCINOLONE ACETONIDE 40 MG/ML
40 INJECTION, SUSPENSION INTRA-ARTICULAR; INTRAMUSCULAR ONCE
Status: COMPLETED | OUTPATIENT
Start: 2024-04-29 | End: 2024-04-29

## 2024-04-29 RX ADMIN — BUPIVACAINE HYDROCHLORIDE 35 MG: 5 INJECTION, SOLUTION PERINEURAL at 10:33

## 2024-04-29 RX ADMIN — TRIAMCINOLONE ACETONIDE 40 MG: 40 INJECTION, SUSPENSION INTRA-ARTICULAR; INTRAMUSCULAR at 10:34

## 2024-04-29 NOTE — PROGRESS NOTES
Patient: Alie Tsai                MRN: [id number]       SSN: [id number]  YOB: 1955        AGE: 68 y.o.        SEX: female      OFFICE NOTE DICTATED    PCP: Dawna Dougherty APRN - NP  04/29/24    Chief Complaint   Patient presents with    Knee Pain     LEFT         HISTORY:    Alie Tsai is a 68 y.o. female returns to the office with a recurrence of acute on chronic left knee pain.  Patient seen for similar back in 2021 and completed a course of hyaluronic acid in late December.  She is generally done well since that time but noted about 30 days ago pain returning to the left knee which limits her ability to stand for extended periods and walk distance.  Patient like to repeat hyaluronic acid for the left knee.        Failed to redirect to the Timeline version of the [employer] SmartLink.    No results found for: \"HBA1C\", \"ZMT2GCQY\"      4/29/2024     9:42 AM 1/10/2024     9:38 AM 12/28/2021    10:20 AM 12/21/2021     9:20 AM 12/14/2021     1:36 PM 10/18/2021    10:44 AM 5/20/2021    12:28 PM   Weight Metrics   Weight 287 lb 291 lb 291 lb 3.2 oz 291 lb 6.4 oz 288 lb 12.8 oz 290 lb 9.6 oz 286 lb   BMI (Calculated) 46.4 kg/m2 45.7 kg/m2 45.7 kg/m2 0 kg/m2 45.3 kg/m2 45.6 kg/m2 44.9 kg/m2          Problem List Items Addressed This Visit    None  Visit Diagnoses       Chronic pain of left knee    -  Primary    Relevant Medications    BUPivacaine (MARCAINE) 0.5 % injection 35 mg (Completed)    triamcinolone acetonide (KENALOG-40) injection 40 mg (Completed)    Other Relevant Orders    [98302] Knee 3V (Completed)    DRAIN/INJECT LARGE JOINT/BURSA    Arthritis of knee, left        Relevant Medications    triamcinolone acetonide (KENALOG-40) injection 40 mg (Completed)    Effusion of bursa of knee, left        Decreased range of motion (ROM) of left knee        Obesity, Class III, BMI 40-49.9 (morbid obesity) (HCC)                PAST MEDICAL HISTORY:       Diagnosis Date    Abdominal

## 2024-05-06 ENCOUNTER — OFFICE VISIT (OUTPATIENT)
Age: 69
End: 2024-05-06
Payer: MEDICARE

## 2024-05-06 VITALS — BODY MASS INDEX: 33.07 KG/M2 | HEIGHT: 70 IN | WEIGHT: 231 LBS | TEMPERATURE: 97.8 F

## 2024-05-06 DIAGNOSIS — M17.12 ARTHRITIS OF KNEE, LEFT: ICD-10-CM

## 2024-05-06 DIAGNOSIS — M25.561 CHRONIC PAIN OF RIGHT KNEE: ICD-10-CM

## 2024-05-06 DIAGNOSIS — G89.29 CHRONIC PAIN OF RIGHT KNEE: ICD-10-CM

## 2024-05-06 DIAGNOSIS — M17.11 ARTHRITIS OF RIGHT KNEE: Primary | ICD-10-CM

## 2024-05-06 PROCEDURE — 99213 OFFICE O/P EST LOW 20 MIN: CPT | Performed by: PHYSICIAN ASSISTANT

## 2024-05-06 PROCEDURE — G8417 CALC BMI ABV UP PARAM F/U: HCPCS | Performed by: PHYSICIAN ASSISTANT

## 2024-05-06 PROCEDURE — G8399 PT W/DXA RESULTS DOCUMENT: HCPCS | Performed by: PHYSICIAN ASSISTANT

## 2024-05-06 PROCEDURE — 20611 DRAIN/INJ JOINT/BURSA W/US: CPT | Performed by: PHYSICIAN ASSISTANT

## 2024-05-06 PROCEDURE — 1036F TOBACCO NON-USER: CPT | Performed by: PHYSICIAN ASSISTANT

## 2024-05-06 PROCEDURE — 1123F ACP DISCUSS/DSCN MKR DOCD: CPT | Performed by: PHYSICIAN ASSISTANT

## 2024-05-06 PROCEDURE — G8428 CUR MEDS NOT DOCUMENT: HCPCS | Performed by: PHYSICIAN ASSISTANT

## 2024-05-06 PROCEDURE — 73560 X-RAY EXAM OF KNEE 1 OR 2: CPT | Performed by: PHYSICIAN ASSISTANT

## 2024-05-06 PROCEDURE — 3017F COLORECTAL CA SCREEN DOC REV: CPT | Performed by: PHYSICIAN ASSISTANT

## 2024-05-06 PROCEDURE — 1090F PRES/ABSN URINE INCON ASSESS: CPT | Performed by: PHYSICIAN ASSISTANT

## 2024-05-06 RX ORDER — BUPIVACAINE HYDROCHLORIDE 5 MG/ML
7 INJECTION, SOLUTION PERINEURAL ONCE
Status: COMPLETED | OUTPATIENT
Start: 2024-05-06 | End: 2024-05-06

## 2024-05-06 RX ORDER — TRIAMCINOLONE ACETONIDE 40 MG/ML
40 INJECTION, SUSPENSION INTRA-ARTICULAR; INTRAMUSCULAR ONCE
Status: COMPLETED | OUTPATIENT
Start: 2024-05-06 | End: 2024-05-06

## 2024-05-06 RX ADMIN — TRIAMCINOLONE ACETONIDE 40 MG: 40 INJECTION, SUSPENSION INTRA-ARTICULAR; INTRAMUSCULAR at 13:43

## 2024-05-06 RX ADMIN — BUPIVACAINE HYDROCHLORIDE 35 MG: 5 INJECTION, SOLUTION PERINEURAL at 13:42

## 2024-05-06 NOTE — PROGRESS NOTES
-5 with a varus deformity extension plane.    MCL LCL ACL and PCL intact no laxity pain or giveaway    Patella tracks midline with crepitation.      IMAGING:  Imaging read by myself and interpreted as follows:  X-ray: Historical tricompartmental osteoarthritis right knee    IMPRESSION:      ICD-10-CM    1. Arthritis of right knee  M17.11 BUPivacaine (MARCAINE) 0.5 % injection 35 mg     triamcinolone acetonide (KENALOG-40) injection 40 mg     AMB POC US DRAIN/INJECT LARGE JOINT/BURSA     Ambulatory Referral to Ortho Injection      2. Chronic pain of right knee  M25.561 [74673] Knee 1-2V    G89.29 BUPivacaine (MARCAINE) 0.5 % injection 35 mg     triamcinolone acetonide (KENALOG-40) injection 40 mg     AMB POC US DRAIN/INJECT LARGE JOINT/BURSA     Ambulatory Referral to Ortho Injection     CANCELED: [99128] Knee 3V      3. Arthritis of knee, left  M17.12 BUPivacaine (MARCAINE) 0.5 % injection 35 mg     triamcinolone acetonide (KENALOG-40) injection 40 mg     AMB POC US DRAIN/INJECT LARGE JOINT/BURSA     Ambulatory Referral to Ortho Injection           PLAN:  Currently recommending a low-dose cortisone injection as well as preauthorization for hyaluronic acid.  Patient will continue weight loss strategies.  Physical therapy offered.  She will also continue Voltaren gel 4 g to the anterior right knee 4 times a day.    Voltaren was sent to pharmacy on file.  Follow-up when hyaluronic acid is available for review and administration.      Special note: Medication management discussed and patient will begin the following per recommended dosing.    (Yes) Voltaren 4 g 4 times a day left anterior knee    (  ) Flexeril 10mg po 3 x daily as needed muscle spasm / pain    (  ) Physical therapy ordered for range of motion all modalities, stretching, range of motion of specific functional group associated     with primary treating condition,  gentle strengthening of musculature with attention to increasing endurance, gait

## 2024-07-30 ENCOUNTER — OFFICE VISIT (OUTPATIENT)
Age: 69
End: 2024-07-30
Payer: MEDICARE

## 2024-07-30 VITALS — BODY MASS INDEX: 45.96 KG/M2 | WEIGHT: 286 LBS | HEIGHT: 66 IN | TEMPERATURE: 97.7 F

## 2024-07-30 DIAGNOSIS — M17.11 ARTHRITIS OF RIGHT KNEE: ICD-10-CM

## 2024-07-30 DIAGNOSIS — M17.12 ARTHRITIS OF KNEE, LEFT: Primary | ICD-10-CM

## 2024-07-30 PROCEDURE — G8417 CALC BMI ABV UP PARAM F/U: HCPCS | Performed by: PHYSICIAN ASSISTANT

## 2024-07-30 PROCEDURE — G8428 CUR MEDS NOT DOCUMENT: HCPCS | Performed by: PHYSICIAN ASSISTANT

## 2024-07-30 PROCEDURE — 1036F TOBACCO NON-USER: CPT | Performed by: PHYSICIAN ASSISTANT

## 2024-07-30 PROCEDURE — 1090F PRES/ABSN URINE INCON ASSESS: CPT | Performed by: PHYSICIAN ASSISTANT

## 2024-07-30 PROCEDURE — G8399 PT W/DXA RESULTS DOCUMENT: HCPCS | Performed by: PHYSICIAN ASSISTANT

## 2024-07-30 PROCEDURE — 99214 OFFICE O/P EST MOD 30 MIN: CPT | Performed by: PHYSICIAN ASSISTANT

## 2024-07-30 PROCEDURE — 1123F ACP DISCUSS/DSCN MKR DOCD: CPT | Performed by: PHYSICIAN ASSISTANT

## 2024-07-30 PROCEDURE — 3017F COLORECTAL CA SCREEN DOC REV: CPT | Performed by: PHYSICIAN ASSISTANT

## 2024-07-30 PROCEDURE — 20611 DRAIN/INJ JOINT/BURSA W/US: CPT | Performed by: PHYSICIAN ASSISTANT

## 2024-07-30 RX ORDER — HYALURONATE SODIUM 10 MG/ML
20 SYRINGE (ML) INTRAARTICULAR ONCE
Status: COMPLETED | OUTPATIENT
Start: 2024-07-30 | End: 2024-07-30

## 2024-07-30 RX ADMIN — Medication 20 MG: at 09:53

## 2024-07-30 NOTE — PROGRESS NOTES
Alie Tsai returns for initiation of Euflexxa viscosupplementation for both her knees.    She has a history of bilateral tricompartmental osteoarthritis of the knees.    She is actively participating in a self-guided weight loss program.    Bilateral knees skin intact no warmth, erythema, edema, or ecchymosis.  Trace effusions noted symmetrically.    Procedural: 2 cc Euflexxa injected in the bilateral knees using the superior lateral articular approach was.  There were no complications.  Patient tolerated procedure well.    Plan: Patient to follow-up in 1 week for continuation of care.    Chart reviewed for the following:   Salazar VALENTINO PA-C, have reviewed the History, Physical and updated the Allergic reactions for Alie Tsai    TIME OUT performed immediately prior to start of procedure:   Salazar VALENTINO PA-C, have performed the following reviews on Alie Tsai prior to the start of the procedure:            * Patient was identified by name and date of birth   * Agreement on procedure being performed was verified  * Risks and Benefits explained to the patient  * Procedure site verified and marked as necessary  * Patient was positioned for comfort  * Consent was signed and verified             Date of procedure: 07/30/24    Time: 9:51 AM    Procedure performed by:  Salazar Blake PA-C    Provider assisted by: None     Patient assisted by: self    How tolerated by patient: tolerated the procedure well with no complications    Comments: none    The patient is asked to continue to rest the area for a few more days before resuming regular activities.  It may be more painful for the first 1-2 days.  Watch for fever, or increased swelling or persistent pain in the joint. Call or return to clinic prn if such symptoms occur or there is failure to improve as anticipated.      Post injection instructions were given to patient: Remove the band aid in 3 hours, Wash site with clean soap, No

## 2024-08-06 ENCOUNTER — OFFICE VISIT (OUTPATIENT)
Age: 69
End: 2024-08-06
Payer: MEDICARE

## 2024-08-06 VITALS — BODY MASS INDEX: 46.12 KG/M2 | WEIGHT: 287 LBS | HEIGHT: 66 IN | TEMPERATURE: 97.8 F

## 2024-08-06 DIAGNOSIS — M17.12 ARTHRITIS OF KNEE, LEFT: Primary | ICD-10-CM

## 2024-08-06 DIAGNOSIS — M17.11 ARTHRITIS OF RIGHT KNEE: ICD-10-CM

## 2024-08-06 PROCEDURE — 20611 DRAIN/INJ JOINT/BURSA W/US: CPT | Performed by: PHYSICIAN ASSISTANT

## 2024-08-06 PROCEDURE — 90000 NO LOS: CPT | Performed by: PHYSICIAN ASSISTANT

## 2024-08-06 RX ORDER — HYALURONATE SODIUM 10 MG/ML
20 SYRINGE (ML) INTRAARTICULAR ONCE
Status: COMPLETED | OUTPATIENT
Start: 2024-08-06 | End: 2024-08-06

## 2024-08-06 RX ADMIN — Medication 20 MG: at 10:32

## 2024-08-06 RX ADMIN — Medication 20 MG: at 10:31

## 2024-08-06 NOTE — PROGRESS NOTES
Alie Tsai returns for continuation of Euflexxa viscosupplementation for both her knees.    She has a history of bilateral tricompartmental osteoarthritis of the knees.    She is actively participating in a self-guided weight loss program.    Bilateral knees skin intact no warmth, erythema, edema, or ecchymosis.  Trace effusions noted symmetrically.    Procedural: 2 cc Euflexxa injected in the bilateral knees using the superior lateral articular approach was.  There were no complications.  Patient tolerated procedure well.    Plan: Patient to follow-up in 1 week for continuation of care.    Chart reviewed for the following:   Salazar VALENTINO PA-C, have reviewed the History, Physical and updated the Allergic reactions for Alie Tsai    TIME OUT performed immediately prior to start of procedure:   Salazar VALENTINO PA-C, have performed the following reviews on Alie Tsai prior to the start of the procedure:            * Patient was identified by name and date of birth   * Agreement on procedure being performed was verified  * Risks and Benefits explained to the patient  * Procedure site verified and marked as necessary  * Patient was positioned for comfort  * Consent was signed and verified             Date of procedure: 08/06/24    Time: 9:38 AM    Procedure performed by:  Salazar Blake PA-C    Provider assisted by: None     Patient assisted by: self    How tolerated by patient: tolerated the procedure well with no complications    Comments: none    The patient is asked to continue to rest the area for a few more days before resuming regular activities.  It may be more painful for the first 1-2 days.  Watch for fever, or increased swelling or persistent pain in the joint. Call or return to clinic prn if such symptoms occur or there is failure to improve as anticipated.      Post injection instructions were given to patient: Remove the band aid in 3 hours, Wash site with clean soap, No further

## 2024-08-12 ENCOUNTER — HOSPITAL ENCOUNTER (EMERGENCY)
Facility: HOSPITAL | Age: 69
Discharge: HOME | End: 2024-08-12
Payer: MEDICARE

## 2024-08-12 ENCOUNTER — APPOINTMENT (OUTPATIENT)
Facility: HOSPITAL | Age: 69
End: 2024-08-12
Payer: MEDICARE

## 2024-08-12 VITALS
BODY MASS INDEX: 44.26 KG/M2 | TEMPERATURE: 98 F | RESPIRATION RATE: 18 BRPM | SYSTOLIC BLOOD PRESSURE: 155 MMHG | OXYGEN SATURATION: 100 % | HEIGHT: 67 IN | DIASTOLIC BLOOD PRESSURE: 99 MMHG | HEART RATE: 53 BPM | WEIGHT: 282 LBS

## 2024-08-12 DIAGNOSIS — R10.9 FLANK PAIN: Primary | ICD-10-CM

## 2024-08-12 LAB
ALBUMIN SERPL-MCNC: 3.5 G/DL (ref 3.4–5)
ALBUMIN/GLOB SERPL: 0.9 (ref 0.8–1.7)
ALP SERPL-CCNC: 70 U/L (ref 45–117)
ALT SERPL-CCNC: 18 U/L (ref 13–56)
ANION GAP SERPL CALC-SCNC: 6 MMOL/L (ref 3–18)
APPEARANCE UR: CLEAR
AST SERPL-CCNC: 14 U/L (ref 10–38)
BACTERIA URNS QL MICRO: NEGATIVE /HPF
BASOPHILS # BLD: 0 K/UL (ref 0–0.1)
BASOPHILS NFR BLD: 1 % (ref 0–2)
BILIRUB SERPL-MCNC: 0.6 MG/DL (ref 0.2–1)
BILIRUB UR QL: NEGATIVE
BUN SERPL-MCNC: 22 MG/DL (ref 7–18)
BUN/CREAT SERPL: 27 (ref 12–20)
CALCIUM SERPL-MCNC: 9.5 MG/DL (ref 8.5–10.1)
CHLORIDE SERPL-SCNC: 106 MMOL/L (ref 100–111)
CO2 SERPL-SCNC: 28 MMOL/L (ref 21–32)
COLOR UR: YELLOW
CREAT SERPL-MCNC: 0.81 MG/DL (ref 0.6–1.3)
DIFFERENTIAL METHOD BLD: ABNORMAL
EOSINOPHIL # BLD: 0.2 K/UL (ref 0–0.4)
EOSINOPHIL NFR BLD: 4 % (ref 0–5)
EPITH CASTS URNS QL MICRO: NORMAL /LPF (ref 0–5)
ERYTHROCYTE [DISTWIDTH] IN BLOOD BY AUTOMATED COUNT: 15.2 % (ref 11.6–14.5)
GLOBULIN SER CALC-MCNC: 4.1 G/DL (ref 2–4)
GLUCOSE SERPL-MCNC: 91 MG/DL (ref 74–99)
GLUCOSE UR STRIP.AUTO-MCNC: NEGATIVE MG/DL
HCT VFR BLD AUTO: 40.1 % (ref 35–45)
HGB BLD-MCNC: 13.2 G/DL (ref 12–16)
HGB UR QL STRIP: ABNORMAL
IMM GRANULOCYTES # BLD AUTO: 0 K/UL (ref 0–0.04)
IMM GRANULOCYTES NFR BLD AUTO: 0 % (ref 0–0.5)
KETONES UR QL STRIP.AUTO: NEGATIVE MG/DL
LEUKOCYTE ESTERASE UR QL STRIP.AUTO: NEGATIVE
LIPASE SERPL-CCNC: 32 U/L (ref 13–75)
LYMPHOCYTES # BLD: 1.2 K/UL (ref 0.9–3.6)
LYMPHOCYTES NFR BLD: 22 % (ref 21–52)
MCH RBC QN AUTO: 28.8 PG (ref 24–34)
MCHC RBC AUTO-ENTMCNC: 32.9 G/DL (ref 31–37)
MCV RBC AUTO: 87.6 FL (ref 78–100)
MONOCYTES # BLD: 0.4 K/UL (ref 0.05–1.2)
MONOCYTES NFR BLD: 8 % (ref 3–10)
NEUTS SEG # BLD: 3.5 K/UL (ref 1.8–8)
NEUTS SEG NFR BLD: 65 % (ref 40–73)
NITRITE UR QL STRIP.AUTO: NEGATIVE
NRBC # BLD: 0 K/UL (ref 0–0.01)
NRBC BLD-RTO: 0 PER 100 WBC
PH UR STRIP: 5.5 (ref 5–8)
PLATELET # BLD AUTO: 269 K/UL (ref 135–420)
PMV BLD AUTO: 10.9 FL (ref 9.2–11.8)
POTASSIUM SERPL-SCNC: 3.7 MMOL/L (ref 3.5–5.5)
PROT SERPL-MCNC: 7.6 G/DL (ref 6.4–8.2)
PROT UR STRIP-MCNC: 300 MG/DL
RBC # BLD AUTO: 4.58 M/UL (ref 4.2–5.3)
RBC #/AREA URNS HPF: NORMAL /HPF (ref 0–5)
SODIUM SERPL-SCNC: 140 MMOL/L (ref 136–145)
SP GR UR REFRACTOMETRY: 1.02 (ref 1–1.03)
UROBILINOGEN UR QL STRIP.AUTO: 0.2 EU/DL (ref 0.2–1)
WBC # BLD AUTO: 5.4 K/UL (ref 4.6–13.2)
WBC URNS QL MICRO: NORMAL /HPF (ref 0–4)

## 2024-08-12 PROCEDURE — 6360000002 HC RX W HCPCS: Performed by: PHYSICIAN ASSISTANT

## 2024-08-12 PROCEDURE — 2580000003 HC RX 258: Performed by: PHYSICIAN ASSISTANT

## 2024-08-12 PROCEDURE — 81001 URINALYSIS AUTO W/SCOPE: CPT

## 2024-08-12 PROCEDURE — 80053 COMPREHEN METABOLIC PANEL: CPT

## 2024-08-12 PROCEDURE — 83690 ASSAY OF LIPASE: CPT

## 2024-08-12 PROCEDURE — 74176 CT ABD & PELVIS W/O CONTRAST: CPT

## 2024-08-12 PROCEDURE — 96374 THER/PROPH/DIAG INJ IV PUSH: CPT

## 2024-08-12 PROCEDURE — 85025 COMPLETE CBC W/AUTO DIFF WBC: CPT

## 2024-08-12 PROCEDURE — 99284 EMERGENCY DEPT VISIT MOD MDM: CPT

## 2024-08-12 RX ORDER — ACETAMINOPHEN 500 MG
1000 TABLET ORAL EVERY 6 HOURS PRN
Qty: 56 TABLET | Refills: 0 | Status: SHIPPED | OUTPATIENT
Start: 2024-08-12 | End: 2024-08-19

## 2024-08-12 RX ORDER — CYCLOBENZAPRINE HCL 10 MG
10 TABLET ORAL 2 TIMES DAILY PRN
Qty: 12 TABLET | Refills: 0 | Status: SHIPPED | OUTPATIENT
Start: 2024-08-12 | End: 2024-08-22

## 2024-08-12 RX ORDER — LIDOCAINE 50 MG/G
1 PATCH TOPICAL DAILY
Qty: 30 PATCH | Refills: 0 | Status: SHIPPED | OUTPATIENT
Start: 2024-08-12

## 2024-08-12 RX ORDER — HYDROCODONE BITARTRATE AND ACETAMINOPHEN 5; 325 MG/1; MG/1
1 TABLET ORAL EVERY 6 HOURS PRN
Qty: 6 TABLET | Refills: 0 | Status: SHIPPED | OUTPATIENT
Start: 2024-08-12 | End: 2024-08-12

## 2024-08-12 RX ORDER — HYDROCODONE BITARTRATE AND ACETAMINOPHEN 5; 325 MG/1; MG/1
1 TABLET ORAL EVERY 6 HOURS PRN
Qty: 6 TABLET | Refills: 0 | Status: SHIPPED | OUTPATIENT
Start: 2024-08-12 | End: 2024-08-15

## 2024-08-12 RX ORDER — 0.9 % SODIUM CHLORIDE 0.9 %
500 INTRAVENOUS SOLUTION INTRAVENOUS ONCE
Status: COMPLETED | OUTPATIENT
Start: 2024-08-12 | End: 2024-08-12

## 2024-08-12 RX ORDER — KETOROLAC TROMETHAMINE 15 MG/ML
15 INJECTION, SOLUTION INTRAMUSCULAR; INTRAVENOUS
Status: COMPLETED | OUTPATIENT
Start: 2024-08-12 | End: 2024-08-12

## 2024-08-12 RX ADMIN — KETOROLAC TROMETHAMINE 15 MG: 15 INJECTION, SOLUTION INTRAMUSCULAR; INTRAVENOUS at 14:33

## 2024-08-12 RX ADMIN — SODIUM CHLORIDE 500 ML: 900 INJECTION, SOLUTION INTRAVENOUS at 14:33

## 2024-08-12 ASSESSMENT — PAIN SCALES - GENERAL
PAINLEVEL_OUTOF10: 5
PAINLEVEL_OUTOF10: 5

## 2024-08-12 ASSESSMENT — LIFESTYLE VARIABLES
HOW MANY STANDARD DRINKS CONTAINING ALCOHOL DO YOU HAVE ON A TYPICAL DAY: PATIENT DOES NOT DRINK
HOW OFTEN DO YOU HAVE A DRINK CONTAINING ALCOHOL: NEVER

## 2024-08-12 ASSESSMENT — ENCOUNTER SYMPTOMS
SHORTNESS OF BREATH: 0
ABDOMINAL PAIN: 0
NAUSEA: 0
VOMITING: 0

## 2024-08-12 ASSESSMENT — PAIN DESCRIPTION - ORIENTATION: ORIENTATION: RIGHT

## 2024-08-12 ASSESSMENT — PAIN DESCRIPTION - LOCATION
LOCATION: ABDOMEN
LOCATION: FLANK

## 2024-08-12 ASSESSMENT — PAIN - FUNCTIONAL ASSESSMENT: PAIN_FUNCTIONAL_ASSESSMENT: 0-10

## 2024-08-12 NOTE — ED TRIAGE NOTES
Patient presents to ER with c/o rt flank pain for past 3 days. Patient states she was seen at [facility] prior to coming here.

## 2024-08-12 NOTE — ED PROVIDER NOTES
EMERGENCY DEPARTMENT HISTORY AND PHYSICAL EXAM    7:19 PM      Date: 8/12/2024  Patient Name: Alie Tsai    History of Presenting Illness     Chief Complaint   Patient presents with    Flank Pain         History Provided By: Patient    Additional History (Context): Alie Tsai is a 68 y.o. female with   Past Medical History:   Diagnosis Date    Abdominal pain     Arthritis     Colon polyp     History of fall 11/2014    Right shoulder pain, low back pain    Hypertension     Ill-defined condition     MVP resolved    MVP (mitral valve prolapse)     Osteoarthritis of knee     Thromboembolus (HCC)     mario DVT  2007    Thyroid disease     Unspecified sleep apnea     uses cpap   } who presents with complaint of right flank pain with radiation to the right upper quadrant x 3 days.  [facility] and sent to the ED for further assessment and a CT scan.  Patient denies fever or chills, chest pain, dyspnea, cough, nausea or vomiting, diarrhea, constipation, dysuria, hematuria.  Denies fall or trauma.  Denies history of pyelonephritis or nephrolithiasis, cholelithiasis.  Notes she has tried over-the-counter medication for symptoms without relief.     PCP: Kimberlee Velasquez PA    No current facility-administered medications for this encounter.     Current Outpatient Medications   Medication Sig Dispense Refill    acetaminophen (TYLENOL) 500 MG tablet Take 2 tablets by mouth every 6 hours as needed for Pain 56 tablet 0    lidocaine (LIDODERM) 5 % Place 1 patch onto the skin daily 12 hours on, 12 hours off. 30 patch 0    cyclobenzaprine (FLEXERIL) 10 MG tablet Take 1 tablet by mouth 2 times daily as needed for Muscle spasms 12 tablet 0    HYDROcodone-acetaminophen (NORCO) 5-325 MG per tablet Take 1 tablet by mouth every 6 hours as needed for Pain for up to 3 days. Intended supply: 3 days. Take lowest dose possible to manage pain Max Daily Amount: 4 tablets 6 tablet 0    amLODIPine

## 2024-08-12 NOTE — ED NOTES
Pt in NAD, Discussed DC with the patient and all questions fully answered. Pt denies concerns at this time. Pt ambulated without difficulty to the lobby.

## 2024-08-13 ENCOUNTER — OFFICE VISIT (OUTPATIENT)
Age: 69
End: 2024-08-13
Payer: MEDICARE

## 2024-08-13 VITALS — TEMPERATURE: 97.5 F | WEIGHT: 285 LBS | HEIGHT: 67 IN | BODY MASS INDEX: 44.73 KG/M2

## 2024-08-13 DIAGNOSIS — M17.11 ARTHRITIS OF RIGHT KNEE: ICD-10-CM

## 2024-08-13 DIAGNOSIS — M17.12 ARTHRITIS OF KNEE, LEFT: Primary | ICD-10-CM

## 2024-08-13 PROCEDURE — 20610 DRAIN/INJ JOINT/BURSA W/O US: CPT | Performed by: PHYSICIAN ASSISTANT

## 2024-08-13 PROCEDURE — 90000 NO LOS: CPT | Performed by: PHYSICIAN ASSISTANT

## 2024-08-13 RX ORDER — HYALURONATE SODIUM 10 MG/ML
20 SYRINGE (ML) INTRAARTICULAR ONCE
Status: COMPLETED | OUTPATIENT
Start: 2024-08-13 | End: 2024-08-13

## 2024-08-13 RX ORDER — IBUPROFEN 800 MG/1
800 TABLET ORAL 3 TIMES DAILY PRN
Qty: 90 TABLET | Refills: 0 | Status: SHIPPED | OUTPATIENT
Start: 2024-08-13

## 2024-08-13 RX ADMIN — Medication 20 MG: at 09:09

## 2024-08-13 RX ADMIN — Medication 20 MG: at 09:08

## 2024-08-13 NOTE — PROGRESS NOTES
further dressings there after. Call Salazar Blake PA-C [phone] if any: pain, redness, drainage, fever, or any concerns/questions  that may have regarding the injection. Patient was advised on the signs of infection and instructed to go to the ER  if it is office after hours.  Please Note:      In addition she was given a prescription for Motrin 800 mg p.o. 3 times daily.  She does have a onset of back pain that she will rely on medication provided by the emergency room over the next 2 to 3 days of.  If she is still experiencing difficulty by the end of the week she is to call our office for formal evaluation.  No x-rays have been obtained of the low back and a limited exam was performed at urgent care.  She does have a CT of the abdomen which is documented on file and reveals no pathology associated with her lumbar spine area.  CT reviewed.

## 2025-02-05 ENCOUNTER — TRANSCRIBE ORDERS (OUTPATIENT)
Facility: HOSPITAL | Age: 70
End: 2025-02-05

## 2025-02-05 DIAGNOSIS — Z12.31 OTHER SCREENING MAMMOGRAM: Primary | ICD-10-CM

## 2025-03-07 ENCOUNTER — HOSPITAL ENCOUNTER (OUTPATIENT)
Facility: HOSPITAL | Age: 70
Discharge: HOME | End: 2025-03-10
Payer: MEDICARE

## 2025-03-07 VITALS — HEIGHT: 67 IN | WEIGHT: 278 LBS | BODY MASS INDEX: 43.63 KG/M2

## 2025-03-07 DIAGNOSIS — Z12.31 OTHER SCREENING MAMMOGRAM: ICD-10-CM

## 2025-03-07 PROCEDURE — 77063 BREAST TOMOSYNTHESIS BI: CPT

## 2025-05-29 ENCOUNTER — HOSPITAL ENCOUNTER (OUTPATIENT)
Facility: HOSPITAL | Age: 70
Setting detail: RECURRING SERIES
End: 2025-05-29
Payer: MEDICARE

## 2025-05-29 PROCEDURE — 97535 SELF CARE MNGMENT TRAINING: CPT

## 2025-05-29 PROCEDURE — 97162 PT EVAL MOD COMPLEX 30 MIN: CPT

## 2025-05-29 NOTE — PROGRESS NOTES
PF Daily Treatment Note    Patient Name: Alie Tsai    Date: 2025    : 1955  Insurance: Payor: MEDICARE / Plan: MEDICARE PART A AND B / Product Type: *No Product type* /      Patient  verified yes     Visit #   Current / Total 1 16   Time   In / Out 11:25A 12:09P   Pain   In / Out 0/10 0/10   Subjective Functional Status/Changes: See poc       Treatment Area: OAB (overactive bladder)  Urinary urgency  Urge incontinence    14 min [x]Eval                  []Re-Eval     Therapeutic Procedures:  Tx Min Billable or 1:1 Min (if diff from Tx Min) Procedure, Rationale, Specifics   30  [id number] Self Care/Home Management (timed):  improve patient knowledge and understanding of home injury/symptom/pain management and activity modification  to improve patient's ability to progress to PLOF and address remaining functional goals.  (see flow sheet as applicable)     Details if applicable:  Purpose of PF PT, not lying in bed when not attempting to sleep, bladder irritants, bladder cycle; review of handouts  Rationale: Increase pelvic floor muscle strength, Improve quality of pelvic floor contractions, Decrease resting tone of the pelvic floor, Increase tissue extensibility of the pelvic floor muscles, and Increase core strength in order to Increase urinary continence, Decrease urinary urgency, Increase ability to delay urination, Decrease nocturia, and Improve ability to perform ADLs.   Total  30 Total Reminder: MC/BC bill using total billable min of TIMED therapeutic procedures (example: do not include dry needle or estim unattended, both untimed codes, in totals to left)     [x]  Patient Education billed concurrently with other procedures     Other Objective/Functional Measures:       ASSESSMENT/Changes in Function: see poc    []  Decrease # of leaks   [] No change []  Improving [] Resolved     []  Decrease hypertonus [] No change []  Improving [] Resolved     []  Increase void interval [] No change []

## 2025-05-29 NOTE — THERAPY EVALUATION
[facility] - [facility]  [address] [place] [address]  [phone] fax    Plan of Care/ Statement of Necessity for Physical Therapy Services    Patient Name: Alie Tsai : 1955   Medical   Diagnosis: Overactive bladder [N32.81]  Urgency of urination [R39.15]  Urge incontinence [N39.41] Treatment Diagnosis: N39.41  URGE INCONTINENCE and M62.89  OTHER SPECIFIED DISORDERS OF MUSCLE      Onset Date: Chronic; worsened 2024 Payor :  Payor: MEDICARE / Plan: MEDICARE PART A AND B / Product Type: *No Product type* /    Referral Source: Peace Medina APRN* Start of Care (SOC): 2025   Prior Hospitalization: See medical history Provider #: [id number]   Prior Level of Function: enjoys traveling, retired (post office); enjoys gardening, reading, sewing, quilting; lives with spouse;    Comorbidities: history of ectopic pregnancy, B knee pain              The Plan of Care and following information is based on the information from the initial evaluation.  Assessment/ key information: Patient is a 69-year-old right-handed female who presents to therapy with chief complaint of OAB. Patient reports oral medications were not effective; insurance would not cover a new recommended medication (Gemtesa). She was told she has a smaller bladder than normal. She reports frequency of leakage of 5x/day requiring wear of incontinence garment; symptoms have prevented her from traveling. Symptoms worse at night with frequency of nocturia of 3-4x/night and also worse with lying. She reports ability to delay need to urinate of <10 min. Patient reports frequency of urination during the day of WNL. Patient reports fluid intake of 6-8 8oz glasses/day of mainly water/decaffeinated coffee. Session today emphasized review of purpose of pelvic floor physical therapy exam/treatment and lifestyle/behavioral modifications with education on normal bladder filling cycle and bladder

## 2025-06-05 ENCOUNTER — HOSPITAL ENCOUNTER (OUTPATIENT)
Facility: HOSPITAL | Age: 70
Setting detail: RECURRING SERIES
Discharge: HOME | End: 2025-06-08
Payer: MEDICARE

## 2025-06-05 PROCEDURE — 97535 SELF CARE MNGMENT TRAINING: CPT

## 2025-06-05 PROCEDURE — 97110 THERAPEUTIC EXERCISES: CPT

## 2025-06-05 NOTE — PROGRESS NOTES
change []  Improving [] Resolved     []  Increase PF endurance [] No change []  Improving [] Resolved     []  Increase endurance [] No change []  Improving [] Resolved     []  Decrease # of pads [] No change []  Improving [] Resolved     []  Decrease pain [] No change []  Improving [] Resolved         Short Term Goals: To be accomplished in 4 weeks  Patient will demonstrate accurate performance of home exercise program/pelvic floor contractions as adjunct to physical therapy clinic visits to promote healthy lifestyle and improved quality of life.  Status at evaluation: new goal; educated on PF engagement  2. Patient will report no greater than 3 instances of leakage/day in order to perform ADLs with increased ease.              Status at evaluation: 5x/day  3. Patient will report no greater than 1 instance of nocturia/night in order to improve sleep and QOL.              Status at evaluation: 3x/night  4. Patient will report ability to delay need to urinate of at least 25 min in order to demonstrate improved bladder function.              Status at evaluation: <10 min  Progressing-able to not use restroom for therapy session (44 min) 6/5/25  Long Term Goals: To be accomplished in 8 weeks  Patient will report no greater than 1 instance of leakage/day in order to perform ADLs with increased ease.  Status at evaluation: 5x/day  2. Patient will report no greater than 2 nights with nocturia/week in order to improve sleep and QOL.              Status at evaluation: 3x/night  3.  Patient will report ability to delay need to urinate of at least 45 min in order to demonstrate improved bladder function.              Status at evaluation: <10 min  4. Patient will improve PFDI score by at least 20% in order to demonstrate functional improvement.              Status at evaluation: to be assessed at first follow up visit  Progressing-80.18 6/5/25      Patient will continue to benefit from skilled PT / OT services to modify and

## 2025-06-11 ENCOUNTER — APPOINTMENT (OUTPATIENT)
Facility: HOSPITAL | Age: 70
End: 2025-06-11
Payer: MEDICARE

## 2025-06-19 ENCOUNTER — APPOINTMENT (OUTPATIENT)
Facility: HOSPITAL | Age: 70
End: 2025-06-19
Payer: MEDICARE

## 2025-06-26 ENCOUNTER — HOSPITAL ENCOUNTER (OUTPATIENT)
Facility: HOSPITAL | Age: 70
Setting detail: RECURRING SERIES
Discharge: HOME | End: 2025-06-29
Payer: MEDICARE

## 2025-06-26 PROCEDURE — 97530 THERAPEUTIC ACTIVITIES: CPT

## 2025-06-26 PROCEDURE — 97535 SELF CARE MNGMENT TRAINING: CPT

## 2025-06-26 NOTE — PROGRESS NOTES
PF DAILY TREATMENT NOTE       Patient Name: Alie Tsai    Date: 2025    : 1955  Insurance: Payor: MEDICARE / Plan: MEDICARE PART A AND B / Product Type: *No Product type* /      Patient  verified yes     Visit #   Current / Total 3 16   Time   In / Out 11:22A 12:03P   Pain   In / Out 0/10 0/10   Subjective Functional Status/Changes: Patient reports she missed a week as she had a virus. At night she was very tired. Her incontinence is normally worse at night. It was also worse when she was at the recliner. She finds continence is worse when she is more relaxed. She practiced her kegels but is not seeing as much improvement as she thought. She has not been out of the house since Thursday. She has been watering her plants and going to her garden. She is trying to figure out what contributes to leakage. She has a lift chair for her knees. Doing the urge suppression helps. She wants to have less leakage.She has been taking generic Mybetriq for about 1.5 months but has not yet seen improvement. She is not sure if the new medication will work. She will normally travel by car as she does not like the busyness of airports. 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 She has been reading a lot.      TREATMENT AREA =  OAB (overactive bladder)  Urinary urgency  Urge incontinence      OBJECTIVE         Therapeutic Procedures:  Tx Min Billable or 1:1 Min (if diff from Tx Min) Procedure, Rationale, Specifics   16  [id number] Therapeutic Activity (timed):  use of dynamic activities replicating functional movements to increase ROM, strength, coordination, balance, and proprioception in order to improve patient's ability to progress to PLOF and address remaining functional goals.  (see flow sheet as applicable)     Details if applicable:  goal assessment   Rationale: Increase pelvic floor muscle strength, Improve quality of pelvic floor contractions, Decrease resting tone of the pelvic floor, Increase tissue

## 2025-06-26 NOTE — THERAPY RECERTIFICATION
[facility] - [facility]  [address] [place] [address] - Ph: [phone]   Fx: [phone]  PHYSICAL THERAPY PROGRESS NOTE  [x] Progress Note  [] Discharge Summary    Patient Name: Alie Tsai : 1955   Treatment/Medical Diagnosis: OAB (overactive bladder)  Urinary urgency  Urge incontinence   Referral Source: Peace Medina APRN*     Date of Initial Visit: 25 Attended Visits: 3 Missed Visits: 0       Comorbidities: history of ectopic pregnancy, B knee pain   Prior Level of Function:enjoys traveling, retired (post office); enjoys gardening, reading, sewing, quilting; lives with spouse;     SUMMARY OF TREATMENT  Patient progressing towards initial goals in therapy. She has been limited in attendance due to illness and therapist availability. She reports reduced frequency of urinary leakage/day and improved time to delay urination. She continues to reports greatest UI at rest/at night limiting sleep. She reports continued difficulty determining activities/actions with contribute more to leakage. She reports limited overall change in symptoms with recent initiation of use of Mirabegron. Patient reports improvement in symptoms noted with initiating urge suppression and reports compliance with performing pelvic floor contractions for pelvic floor strengthening but does reports continued limited water intake. Internal assessment has been deferred at this time for patient comfort. She subjectively reports good overall PF strength and endurance but limited power with pelvic floor contractions related to subjective PERF score She would benefit from continued skilled outpatient PT to address PF strength/endurance along bladder habits to improve overall continence and QOL.  Scheduling or transportation issues    CURRENT STATUS/Progress towards Goals:  Short Term Goals: To be accomplished in 4 weeks  Patient will demonstrate accurate performance

## 2025-07-03 ENCOUNTER — HOSPITAL ENCOUNTER (OUTPATIENT)
Facility: HOSPITAL | Age: 70
Setting detail: RECURRING SERIES
Discharge: HOME | End: 2025-07-06
Payer: MEDICARE

## 2025-07-03 PROCEDURE — 97535 SELF CARE MNGMENT TRAINING: CPT

## 2025-07-03 PROCEDURE — 97530 THERAPEUTIC ACTIVITIES: CPT

## 2025-07-03 NOTE — PROGRESS NOTES
and address imbalance/dizziness, and instruct in home and community integration to address functional deficits and attain remaining goals.  []  See Plan of Care  []  See progress note/recertification  []  See Discharge Summary         Progress towards goals / Updated goals:  Patient will demonstrate accurate performance of home exercise program/pelvic floor contractions as adjunct to physical therapy clinic visits to promote healthy lifestyle and improved quality of life.  Status at last progress note:   Progressing with initial HEP   2. Patient will report no greater than 3 instances of leakage/day in order to perform ADLs with increased ease.              Status at last progress note:2-4x/day   3. Patient will report no greater than 1 instance of nocturia/night in order to improve sleep and QOL.              Status at last progress note:-2-4x/night; 3x on average   4. Patient will report ability to delay need to urinate of at least 25 min in order to demonstrate improved bladder function.              Status at last progress note: 15 min max   Progressing-15 min to 4 hours 7/3/25  5.Patient will report no greater than 1 instance of leakage/day in order to perform ADLs with increased ease.  Status at evaluation: 5x/day  6. Patient will report no greater than 2 nights with nocturia/week in order to improve sleep and QOL.              Status at evaluation: 3x/night  7.  Patient will report ability to delay need to urinate of at least 45 min in order to demonstrate improved bladder function.              Status at evaluation: <10 min  8. Patient will improve PFDI score by at least 20% in order to demonstrate functional improvement.              Status at last progress note: 80.18 6/5/25    Next PN due 7/25/25/RC due 7/28/25    PLAN  []  Upgrade activities as tolerated     [x]  Continue plan of care  []  Update interventions per flow sheet       []  Discharge due to:_  []  Other:_      Monet Bell,

## 2025-07-10 ENCOUNTER — HOSPITAL ENCOUNTER (OUTPATIENT)
Facility: HOSPITAL | Age: 70
Setting detail: RECURRING SERIES
Discharge: HOME | End: 2025-07-13
Payer: MEDICARE

## 2025-07-10 PROCEDURE — 97535 SELF CARE MNGMENT TRAINING: CPT

## 2025-07-10 PROCEDURE — 97110 THERAPEUTIC EXERCISES: CPT

## 2025-07-10 NOTE — PROGRESS NOTES
PHYSICAL / OCCUPATIONAL THERAPY - DAILY TREATMENT NOTE    Patient Name: Alie Tsai    Date: 7/10/2025    : 1955  Insurance: Payor: MEDICARE / Plan: MEDICARE PART A AND B / Product Type: *No Product type* /      Patient  verified yes   Visit #   Current / Total 5 16   Time   In / Out 11:24A 12:09P   Pain   In / Out 2/10 2/10   Subjective Functional Status/Changes: Patient reports her knee was getting better then flared up again yesterday. She did not take Tylenol for 1.5 days. She stopped as she was more constipated. She has not been out in the garden as much. She lost weight and is gaining it back as she is not moving as much. She elevates her feet and will have to use the restroom more. She does not feel the medication is helping yet.       TREATMENT AREA =  OAB (overactive bladder)  Urinary urgency  Urge incontinence    OBJECTIVE        Therapeutic Procedures:    Tx Min Billable or 1:1 Min (if diff from Tx Min) Procedure, Rationale, Specifics   17  [id number] Self Care/Home Management (timed):  improve patient knowledge and understanding of home injury/symptom/pain management, positioning, home safety, activity modification, and joint protection strategies  to improve patient's ability to progress to PLOF and address remaining functional goals.  (see flow sheet as applicable)     Details if applicable:  mechanism of Tylenol vs motrin as motrin is nsaid; SilverSneakers, chair yoga, senior station, or HEP for continued gains upon discharge  Rationale: Increase pelvic floor muscle strength, Improve quality of pelvic floor contractions, Decrease resting tone of the pelvic floor, Increase tissue extensibility of the pelvic floor muscles, Increase core strength, and Inhibit abnormal muscle activity in order to Increase urinary continence, Decrease urinary urgency, Increase ability to delay urination, Decrease frequency of urination, Decrease nocturia, and Improve ability to perform ADLs.      28  [id number]

## 2025-07-31 ENCOUNTER — APPOINTMENT (OUTPATIENT)
Facility: HOSPITAL | Age: 70
End: 2025-07-31
Payer: MEDICARE

## 2025-08-08 ENCOUNTER — OFFICE VISIT (OUTPATIENT)
Age: 70
End: 2025-08-08

## 2025-08-08 VITALS — HEIGHT: 67 IN | BODY MASS INDEX: 44.01 KG/M2 | WEIGHT: 280.4 LBS

## 2025-08-08 DIAGNOSIS — G89.29 CHRONIC PAIN OF LEFT KNEE: Primary | ICD-10-CM

## 2025-08-08 DIAGNOSIS — M25.561 CHRONIC PAIN OF RIGHT KNEE: ICD-10-CM

## 2025-08-08 DIAGNOSIS — G89.29 CHRONIC PAIN OF RIGHT KNEE: ICD-10-CM

## 2025-08-08 DIAGNOSIS — M25.562 CHRONIC PAIN OF LEFT KNEE: Primary | ICD-10-CM

## 2025-08-08 RX ORDER — TRIAMCINOLONE ACETONIDE 40 MG/ML
80 INJECTION, SUSPENSION INTRA-ARTICULAR; INTRAMUSCULAR ONCE
Status: COMPLETED | OUTPATIENT
Start: 2025-08-08 | End: 2025-08-08

## 2025-08-08 RX ORDER — BUPIVACAINE HYDROCHLORIDE 5 MG/ML
17 INJECTION, SOLUTION PERINEURAL ONCE
Status: COMPLETED | OUTPATIENT
Start: 2025-08-08 | End: 2025-08-08

## 2025-08-08 RX ADMIN — BUPIVACAINE HYDROCHLORIDE 85 MG: 5 INJECTION, SOLUTION PERINEURAL at 13:43

## 2025-08-08 RX ADMIN — TRIAMCINOLONE ACETONIDE 80 MG: 40 INJECTION, SUSPENSION INTRA-ARTICULAR; INTRAMUSCULAR at 13:44

## 2025-08-15 ENCOUNTER — OFFICE VISIT (OUTPATIENT)
Age: 70
End: 2025-08-15

## 2025-08-15 VITALS — BODY MASS INDEX: 43.76 KG/M2 | WEIGHT: 278.8 LBS | HEIGHT: 67 IN

## 2025-08-15 DIAGNOSIS — E66.813 OBESITY, CLASS III, BMI 40-49.9 (MORBID OBESITY) (HCC): ICD-10-CM

## 2025-08-15 DIAGNOSIS — M25.561 CHRONIC PAIN OF RIGHT KNEE: Primary | ICD-10-CM

## 2025-08-15 DIAGNOSIS — G89.29 CHRONIC PAIN OF RIGHT KNEE: Primary | ICD-10-CM

## 2025-08-15 DIAGNOSIS — M17.11 ARTHRITIS OF RIGHT KNEE: ICD-10-CM

## 2025-08-15 RX ORDER — TRIAMCINOLONE ACETONIDE 40 MG/ML
80 INJECTION, SUSPENSION INTRA-ARTICULAR; INTRAMUSCULAR ONCE
Status: COMPLETED | OUTPATIENT
Start: 2025-08-15 | End: 2025-08-15

## 2025-08-15 RX ORDER — BUPIVACAINE HYDROCHLORIDE 5 MG/ML
7 INJECTION, SOLUTION PERINEURAL ONCE
Status: COMPLETED | OUTPATIENT
Start: 2025-08-15 | End: 2025-08-15

## 2025-08-15 RX ADMIN — BUPIVACAINE HYDROCHLORIDE 35 MG: 5 INJECTION, SOLUTION PERINEURAL at 15:52

## 2025-08-15 RX ADMIN — TRIAMCINOLONE ACETONIDE 80 MG: 40 INJECTION, SUSPENSION INTRA-ARTICULAR; INTRAMUSCULAR at 15:53

## 2025-08-19 ENCOUNTER — TELEPHONE (OUTPATIENT)
Facility: HOSPITAL | Age: 70
End: 2025-08-19

## (undated) DEVICE — SYR 50ML SLIP TIP NSAF LF STRL --

## (undated) DEVICE — SYR 10ML LUER LOK 1/5ML GRAD --

## (undated) DEVICE — CATHETER SUCT TR FL TIP 14FR W/ O CTRL

## (undated) DEVICE — CANNULA ORIG TL CLR W FOAM CUSHIONS AND 14FT SUPL TB 3 CHN

## (undated) DEVICE — GOWN ISOL IMPERV UNIV, DISP, OPEN BACK, BLUE --

## (undated) DEVICE — SOLUTION IRRIG 1000ML H2O STRL BLT

## (undated) DEVICE — FLEX ADVANTAGE 3000CC: Brand: FLEX ADVANTAGE

## (undated) DEVICE — GAUZE,SPONGE,4"X4",16PLY,STRL,LF,10/TRAY: Brand: MEDLINE

## (undated) DEVICE — MEDI-VAC NON-CONDUCTIVE SUCTION TUBING: Brand: CARDINAL HEALTH

## (undated) DEVICE — AIRLIFE™ NASAL OXYGEN CANNULA CURVED, NONFLARED TIP WITH 14 FOOT (4.3 M) CRUSH-RESISTANT TUBING, OVER-THE-EAR STYLE: Brand: AIRLIFE™

## (undated) DEVICE — ENDOSCOPY PUMP TUBING/ CAP SET: Brand: ERBE

## (undated) DEVICE — MEDI-VAC SUCTION HIGH CAPACITY: Brand: CARDINAL HEALTH

## (undated) DEVICE — SYRINGE MED 25GA 3ML L5/8IN SUBQ PLAS W/ DETACH NDL SFTY

## (undated) DEVICE — SYR 20ML LL STRL LF --

## (undated) DEVICE — FLUFF AND POLYMER UNDERPAD,EXTRA HEAVY: Brand: WINGS